# Patient Record
Sex: MALE | Race: WHITE | Employment: OTHER | URBAN - METROPOLITAN AREA
[De-identification: names, ages, dates, MRNs, and addresses within clinical notes are randomized per-mention and may not be internally consistent; named-entity substitution may affect disease eponyms.]

---

## 2017-10-27 PROBLEM — N28.89 KIDNEY MASS: Status: ACTIVE | Noted: 2017-01-01

## 2018-01-01 ENCOUNTER — HOSPITAL ENCOUNTER (OUTPATIENT)
Age: 83
Discharge: HOME | End: 2018-07-05
Payer: COMMERCIAL

## 2018-01-01 ENCOUNTER — HOSPITAL ENCOUNTER (OUTPATIENT)
Age: 83
Discharge: HOME | End: 2018-06-28
Payer: MEDICARE

## 2018-01-01 ENCOUNTER — ANESTHESIA (OUTPATIENT)
Dept: GENERAL RADIOLOGY | Age: 83
DRG: 444 | End: 2018-01-01
Payer: MEDICARE

## 2018-01-01 ENCOUNTER — ANESTHESIA EVENT (OUTPATIENT)
Dept: INTERVENTIONAL RADIOLOGY/VASCULAR | Age: 83
End: 2018-01-01

## 2018-01-01 ENCOUNTER — APPOINTMENT (OUTPATIENT)
Dept: GENERAL RADIOLOGY | Age: 83
DRG: 444 | End: 2018-01-01
Payer: MEDICARE

## 2018-01-01 ENCOUNTER — TELEPHONE (OUTPATIENT)
Dept: PRIMARY CARE CLINIC | Age: 83
End: 2018-01-01

## 2018-01-01 ENCOUNTER — HOSPITAL ENCOUNTER (EMERGENCY)
Age: 83
Discharge: HOME | End: 2018-06-20
Attending: EMERGENCY MEDICINE
Payer: MEDICARE

## 2018-01-01 ENCOUNTER — HOSPITAL ENCOUNTER (OUTPATIENT)
Age: 83
Discharge: HOME | End: 2018-07-03
Payer: MEDICARE

## 2018-01-01 ENCOUNTER — APPOINTMENT (OUTPATIENT)
Dept: MRI IMAGING | Age: 83
DRG: 444 | End: 2018-01-01
Payer: MEDICARE

## 2018-01-01 ENCOUNTER — HOSPITAL ENCOUNTER (INPATIENT)
Age: 83
LOS: 9 days | Discharge: HOSPICE | DRG: 444 | End: 2018-07-05
Attending: EMERGENCY MEDICINE | Admitting: INTERNAL MEDICINE
Payer: MEDICARE

## 2018-01-01 ENCOUNTER — APPOINTMENT (OUTPATIENT)
Dept: ULTRASOUND IMAGING | Age: 83
DRG: 444 | End: 2018-01-01
Payer: MEDICARE

## 2018-01-01 ENCOUNTER — HOSPITAL ENCOUNTER (OUTPATIENT)
Age: 83
Discharge: HOME | End: 2018-04-05
Payer: MEDICARE

## 2018-01-01 ENCOUNTER — ANESTHESIA (OUTPATIENT)
Dept: ENDOSCOPY | Age: 83
DRG: 444 | End: 2018-01-01
Payer: MEDICARE

## 2018-01-01 ENCOUNTER — APPOINTMENT (OUTPATIENT)
Dept: CT IMAGING | Age: 83
DRG: 444 | End: 2018-01-01
Payer: MEDICARE

## 2018-01-01 ENCOUNTER — ANESTHESIA (OUTPATIENT)
Dept: INTERVENTIONAL RADIOLOGY/VASCULAR | Age: 83
End: 2018-01-01

## 2018-01-01 ENCOUNTER — APPOINTMENT (OUTPATIENT)
Dept: INTERVENTIONAL RADIOLOGY/VASCULAR | Age: 83
End: 2018-01-01
Payer: MEDICARE

## 2018-01-01 ENCOUNTER — ANESTHESIA EVENT (OUTPATIENT)
Dept: GENERAL RADIOLOGY | Age: 83
DRG: 444 | End: 2018-01-01
Payer: MEDICARE

## 2018-01-01 ENCOUNTER — ANESTHESIA EVENT (OUTPATIENT)
Dept: ENDOSCOPY | Age: 83
DRG: 444 | End: 2018-01-01
Payer: MEDICARE

## 2018-01-01 VITALS
HEIGHT: 72 IN | OXYGEN SATURATION: 98 % | TEMPERATURE: 97.3 F | WEIGHT: 273 LBS | HEART RATE: 75 BPM | RESPIRATION RATE: 22 BRPM | DIASTOLIC BLOOD PRESSURE: 54 MMHG | SYSTOLIC BLOOD PRESSURE: 80 MMHG | BODY MASS INDEX: 36.98 KG/M2

## 2018-01-01 VITALS
TEMPERATURE: 98.5 F | SYSTOLIC BLOOD PRESSURE: 149 MMHG | RESPIRATION RATE: 16 BRPM | OXYGEN SATURATION: 95 % | DIASTOLIC BLOOD PRESSURE: 76 MMHG | HEART RATE: 76 BPM

## 2018-01-01 VITALS
SYSTOLIC BLOOD PRESSURE: 89 MMHG | DIASTOLIC BLOOD PRESSURE: 52 MMHG | HEART RATE: 64 BPM | RESPIRATION RATE: 18 BRPM | OXYGEN SATURATION: 98 %

## 2018-01-01 VITALS — DIASTOLIC BLOOD PRESSURE: 49 MMHG | OXYGEN SATURATION: 86 % | TEMPERATURE: 96.8 F | SYSTOLIC BLOOD PRESSURE: 69 MMHG

## 2018-01-01 VITALS — SYSTOLIC BLOOD PRESSURE: 89 MMHG | DIASTOLIC BLOOD PRESSURE: 51 MMHG | OXYGEN SATURATION: 95 %

## 2018-01-01 DIAGNOSIS — K83.1 OBSTRUCTIVE JAUNDICE DUE TO MALIGNANT NEOPLASM (HCC): ICD-10-CM

## 2018-01-01 DIAGNOSIS — R31.0 GROSS HEMATURIA: Primary | ICD-10-CM

## 2018-01-01 DIAGNOSIS — I10 ESSENTIAL HYPERTENSION: ICD-10-CM

## 2018-01-01 DIAGNOSIS — N40.1 BENIGN NON-NODULAR PROSTATIC HYPERPLASIA WITH LOWER URINARY TRACT SYMPTOMS: ICD-10-CM

## 2018-01-01 DIAGNOSIS — E78.00 PURE HYPERCHOLESTEROLEMIA: ICD-10-CM

## 2018-01-01 DIAGNOSIS — M1A.49X0 OTHER SECONDARY CHRONIC GOUT OF MULTIPLE SITES WITHOUT TOPHUS: ICD-10-CM

## 2018-01-01 DIAGNOSIS — R16.0 LIVER MASS: Primary | ICD-10-CM

## 2018-01-01 DIAGNOSIS — C80.1 OBSTRUCTIVE JAUNDICE DUE TO MALIGNANT NEOPLASM (HCC): ICD-10-CM

## 2018-01-01 DIAGNOSIS — N17.9 ACUTE RENAL FAILURE, UNSPECIFIED ACUTE RENAL FAILURE TYPE (HCC): Primary | ICD-10-CM

## 2018-01-01 LAB
ADDENDUM ELECTROPHORESIS URINE RANDOM: NORMAL
AFP-TUMOR MARKER: 1 NG/ML (ref 0–9)
ALBUMIN SERPL-MCNC: 1.9 G/DL (ref 3.5–4.7)
ALBUMIN SERPL-MCNC: 2 G/DL (ref 3.5–5.2)
ALBUMIN SERPL-MCNC: 2 G/DL (ref 3.5–5.2)
ALBUMIN SERPL-MCNC: 2.1 G/DL (ref 3.5–5.2)
ALBUMIN SERPL-MCNC: 2.1 G/DL (ref 3.5–5.2)
ALBUMIN SERPL-MCNC: 2.2 G/DL (ref 3.5–5.2)
ALBUMIN SERPL-MCNC: 2.3 G/DL (ref 3.5–5.2)
ALBUMIN SERPL-MCNC: 2.4 G/DL (ref 3.5–5.2)
ALBUMIN SERPL-MCNC: 3.1 G/DL (ref 3.5–5.2)
ALBUMIN SERPL-MCNC: 3.8 G/DL (ref 3.5–5.2)
ALP BLD-CCNC: 151 U/L (ref 40–129)
ALP BLD-CCNC: 739 U/L (ref 40–129)
ALP BLD-CCNC: 767 U/L (ref 40–129)
ALP BLD-CCNC: 801 U/L (ref 40–129)
ALP BLD-CCNC: 820 U/L (ref 40–129)
ALP BLD-CCNC: 865 U/L (ref 40–129)
ALP BLD-CCNC: 866 U/L (ref 40–129)
ALP BLD-CCNC: 897 U/L (ref 40–129)
ALP BLD-CCNC: 901 U/L (ref 40–129)
ALPHA-1-GLOBULIN: 0.3 G/DL (ref 0.2–0.4)
ALPHA-2-GLOBULIN: 0.7 G/FL (ref 0.5–1)
ALT SERPL-CCNC: 12 U/L (ref 0–40)
ALT SERPL-CCNC: 56 U/L (ref 0–40)
ALT SERPL-CCNC: 61 U/L (ref 0–40)
ALT SERPL-CCNC: 66 U/L (ref 0–40)
ALT SERPL-CCNC: 78 U/L (ref 0–40)
ALT SERPL-CCNC: 86 U/L (ref 0–40)
ALT SERPL-CCNC: 86 U/L (ref 0–40)
ALT SERPL-CCNC: 95 U/L (ref 0–40)
ALT SERPL-CCNC: 97 U/L (ref 0–40)
AMORPHOUS: ABNORMAL
AMYLASE: 33 U/L (ref 20–100)
AMYLASE: 34 U/L (ref 20–100)
AMYLASE: 35 U/L (ref 20–100)
AMYLASE: 45 U/L (ref 20–100)
AMYLASE: 85 U/L (ref 20–100)
ANAEROBIC CULTURE: ABNORMAL
ANAEROBIC CULTURE: NORMAL
ANION GAP SERPL CALCULATED.3IONS-SCNC: 12 MMOL/L (ref 7–16)
ANION GAP SERPL CALCULATED.3IONS-SCNC: 13 MMOL/L (ref 7–16)
ANION GAP SERPL CALCULATED.3IONS-SCNC: 13 MMOL/L (ref 7–16)
ANION GAP SERPL CALCULATED.3IONS-SCNC: 14 MMOL/L (ref 7–16)
ANION GAP SERPL CALCULATED.3IONS-SCNC: 15 MMOL/L (ref 7–16)
ANION GAP SERPL CALCULATED.3IONS-SCNC: 16 MMOL/L (ref 7–16)
ANION GAP SERPL CALCULATED.3IONS-SCNC: 18 MMOL/L (ref 7–16)
ANION GAP SERPL CALCULATED.3IONS-SCNC: 18 MMOL/L (ref 7–16)
ANISOCYTOSIS: ABNORMAL
APPEARANCE FLUID: NORMAL
APTT: 25 SEC (ref 24.5–35.1)
APTT: 27.8 SEC (ref 24.5–35.1)
APTT: 28.3 SEC (ref 24.5–35.1)
AST SERPL-CCNC: 104 U/L (ref 0–39)
AST SERPL-CCNC: 106 U/L (ref 0–39)
AST SERPL-CCNC: 113 U/L (ref 0–39)
AST SERPL-CCNC: 163 U/L (ref 0–39)
AST SERPL-CCNC: 166 U/L (ref 0–39)
AST SERPL-CCNC: 199 U/L (ref 0–39)
AST SERPL-CCNC: 21 U/L (ref 0–39)
AST SERPL-CCNC: 214 U/L (ref 0–39)
AST SERPL-CCNC: 228 U/L (ref 0–39)
BACTERIA: ABNORMAL /HPF
BACTERIA: ABNORMAL /HPF
BASOPHILS ABSOLUTE: 0 E9/L (ref 0–0.2)
BASOPHILS ABSOLUTE: 0.03 E9/L (ref 0–0.2)
BASOPHILS ABSOLUTE: 0.04 E9/L (ref 0–0.2)
BASOPHILS ABSOLUTE: 0.04 E9/L (ref 0–0.2)
BASOPHILS ABSOLUTE: 0.05 E9/L (ref 0–0.2)
BASOPHILS ABSOLUTE: 0.05 E9/L (ref 0–0.2)
BASOPHILS ABSOLUTE: 0.06 E9/L (ref 0–0.2)
BASOPHILS ABSOLUTE: 0.06 E9/L (ref 0–0.2)
BASOPHILS ABSOLUTE: 0.09 E9/L (ref 0–0.2)
BASOPHILS ABSOLUTE: 0.1 E9/L (ref 0–0.2)
BASOPHILS RELATIVE PERCENT: 0 % (ref 0–2)
BASOPHILS RELATIVE PERCENT: 0.3 % (ref 0–2)
BASOPHILS RELATIVE PERCENT: 0.4 % (ref 0–2)
BASOPHILS RELATIVE PERCENT: 0.5 % (ref 0–2)
BASOPHILS RELATIVE PERCENT: 0.7 % (ref 0–2)
BASOPHILS RELATIVE PERCENT: 1 % (ref 0–2)
BASOPHILS RELATIVE PERCENT: 1.3 % (ref 0–2)
BETA GLOBULIN: 0.9 G/DL (ref 0.8–1.3)
BILIRUB SERPL-MCNC: 0.4 MG/DL (ref 0–1.2)
BILIRUB SERPL-MCNC: 1.7 MG/DL (ref 0–1.2)
BILIRUB SERPL-MCNC: 2.5 MG/DL (ref 0–1.2)
BILIRUB SERPL-MCNC: 3.4 MG/DL (ref 0–1.2)
BILIRUB SERPL-MCNC: 3.4 MG/DL (ref 0–1.2)
BILIRUB SERPL-MCNC: 3.7 MG/DL (ref 0–1.2)
BILIRUB SERPL-MCNC: 4.7 MG/DL (ref 0–1.2)
BILIRUB SERPL-MCNC: 5 MG/DL (ref 0–1.2)
BILIRUB SERPL-MCNC: 5.4 MG/DL (ref 0–1.2)
BILIRUBIN DIRECT: 1.9 MG/DL (ref 0–0.3)
BILIRUBIN DIRECT: 2.8 MG/DL (ref 0–0.3)
BILIRUBIN DIRECT: 3.3 MG/DL (ref 0–0.3)
BILIRUBIN DIRECT: 3.5 MG/DL (ref 0–0.3)
BILIRUBIN DIRECT: 4.3 MG/DL (ref 0–0.3)
BILIRUBIN DIRECT: 4.6 MG/DL (ref 0–0.3)
BILIRUBIN DIRECT: 4.9 MG/DL (ref 0–0.3)
BILIRUBIN URINE: ABNORMAL
BILIRUBIN URINE: NEGATIVE
BILIRUBIN, INDIRECT: 0.1 MG/DL (ref 0–1)
BILIRUBIN, INDIRECT: 0.2 MG/DL (ref 0–1)
BILIRUBIN, INDIRECT: 0.4 MG/DL (ref 0–1)
BILIRUBIN, INDIRECT: 0.4 MG/DL (ref 0–1)
BILIRUBIN, INDIRECT: 0.5 MG/DL (ref 0–1)
BILIRUBIN, INDIRECT: 0.6 MG/DL (ref 0–1)
BILIRUBIN, INDIRECT: 0.6 MG/DL (ref 0–1)
BLOOD CULTURE, ROUTINE: ABNORMAL
BLOOD CULTURE, ROUTINE: ABNORMAL
BLOOD, URINE: ABNORMAL
BLOOD, URINE: NEGATIVE
BODY FLUID CULTURE, STERILE: NORMAL
BUN BLDV-MCNC: 39 MG/DL (ref 8–23)
BUN BLDV-MCNC: 54 MG/DL (ref 8–23)
BUN BLDV-MCNC: 64 MG/DL (ref 8–23)
BUN BLDV-MCNC: 65 MG/DL (ref 8–23)
BUN BLDV-MCNC: 67 MG/DL (ref 8–23)
BUN BLDV-MCNC: 72 MG/DL (ref 8–23)
BUN BLDV-MCNC: 76 MG/DL (ref 8–23)
BUN BLDV-MCNC: 78 MG/DL (ref 8–23)
BUN BLDV-MCNC: 85 MG/DL (ref 8–23)
BUN BLDV-MCNC: 85 MG/DL (ref 8–23)
BUN BLDV-MCNC: 87 MG/DL (ref 8–23)
BURR CELLS: ABNORMAL
C-REACTIVE PROTEIN: 4.1 MG/DL (ref 0–0.4)
CA 19-9: 1373 U/ML (ref 0–37)
CALCIUM IONIZED: 1.04 MMOL/L (ref 1.15–1.33)
CALCIUM IONIZED: 1.11 MMOL/L (ref 1.15–1.33)
CALCIUM IONIZED: 1.14 MMOL/L (ref 1.15–1.33)
CALCIUM SERPL-MCNC: 6.5 MG/DL (ref 8.6–10.2)
CALCIUM SERPL-MCNC: 6.8 MG/DL (ref 8.6–10.2)
CALCIUM SERPL-MCNC: 7.1 MG/DL (ref 8.6–10.2)
CALCIUM SERPL-MCNC: 7.3 MG/DL (ref 8.6–10.2)
CALCIUM SERPL-MCNC: 7.4 MG/DL (ref 8.6–10.2)
CALCIUM SERPL-MCNC: 7.4 MG/DL (ref 8.6–10.2)
CALCIUM SERPL-MCNC: 7.5 MG/DL (ref 8.6–10.2)
CALCIUM SERPL-MCNC: 8.3 MG/DL (ref 8.6–10.2)
CALCIUM SERPL-MCNC: 8.6 MG/DL (ref 8.6–10.2)
CASTS 2: ABNORMAL /LPF
CASTS UA: ABNORMAL /LPF
CASTS: ABNORMAL /LPF
CASTS: ABNORMAL /LPF
CEA: 59.8 NG/ML (ref 0–5.2)
CELL COUNT FLUID TYPE: NORMAL
CHLORIDE BLD-SCNC: 101 MMOL/L (ref 98–107)
CHLORIDE BLD-SCNC: 102 MMOL/L (ref 98–107)
CHLORIDE BLD-SCNC: 102 MMOL/L (ref 98–107)
CHLORIDE BLD-SCNC: 103 MMOL/L (ref 98–107)
CHLORIDE BLD-SCNC: 105 MMOL/L (ref 98–107)
CHLORIDE BLD-SCNC: 106 MMOL/L (ref 98–107)
CHLORIDE BLD-SCNC: 109 MMOL/L (ref 98–107)
CHLORIDE BLD-SCNC: 110 MMOL/L (ref 98–107)
CHLORIDE BLD-SCNC: 111 MMOL/L (ref 98–107)
CHLORIDE URINE RANDOM: 29 MMOL/L
CK MB: 2.2 NG/ML (ref 0–7.7)
CLARITY: ABNORMAL
CLARITY: CLEAR
CO2: 14 MMOL/L (ref 22–29)
CO2: 15 MMOL/L (ref 22–29)
CO2: 16 MMOL/L (ref 22–29)
CO2: 16 MMOL/L (ref 22–29)
CO2: 17 MMOL/L (ref 22–29)
CO2: 17 MMOL/L (ref 22–29)
CO2: 18 MMOL/L (ref 22–29)
CO2: 18 MMOL/L (ref 22–29)
CO2: 20 MMOL/L (ref 22–29)
CO2: 24 MMOL/L (ref 22–29)
CO2: 25 MMOL/L (ref 22–29)
COLOR FLUID: YELLOW
COLOR: ABNORMAL
COLOR: YELLOW
CORTISOL TOTAL: 14.83 MCG/DL (ref 2.68–18.4)
CORTISOL TOTAL: 17.37 MCG/DL (ref 2.68–18.4)
CREAT SERPL-MCNC: 2.1 MG/DL (ref 0.7–1.2)
CREAT SERPL-MCNC: 2.4 MG/DL (ref 0.7–1.2)
CREAT SERPL-MCNC: 3.1 MG/DL (ref 0.7–1.2)
CREAT SERPL-MCNC: 3.2 MG/DL (ref 0.7–1.2)
CREAT SERPL-MCNC: 3.3 MG/DL (ref 0.7–1.2)
CREAT SERPL-MCNC: 3.6 MG/DL (ref 0.7–1.2)
CREAT SERPL-MCNC: 3.7 MG/DL (ref 0.7–1.2)
CREAT SERPL-MCNC: 3.9 MG/DL (ref 0.7–1.2)
CREAT SERPL-MCNC: 4.2 MG/DL (ref 0.7–1.2)
CREAT SERPL-MCNC: 4.4 MG/DL (ref 0.7–1.2)
CREAT SERPL-MCNC: 4.7 MG/DL (ref 0.7–1.2)
CRYSTALS, UA: ABNORMAL
CYCLOSPORINE A: 188.6 NG/ML
CYCLOSPORINE A: 63.8 NG/ML
EKG ATRIAL RATE: 72 BPM
EKG Q-T INTERVAL: 428 MS
EKG QRS DURATION: 64 MS
EKG QTC CALCULATION (BAZETT): 468 MS
EKG R AXIS: 20 DEGREES
EKG T AXIS: 6 DEGREES
EKG VENTRICULAR RATE: 72 BPM
ELECTROPHORESIS: ABNORMAL
EOSINOPHILS ABSOLUTE: 0.04 E9/L (ref 0.05–0.5)
EOSINOPHILS ABSOLUTE: 0.06 E9/L (ref 0.05–0.5)
EOSINOPHILS ABSOLUTE: 0.08 E9/L (ref 0.05–0.5)
EOSINOPHILS ABSOLUTE: 0.1 E9/L (ref 0.05–0.5)
EOSINOPHILS ABSOLUTE: 0.12 E9/L (ref 0.05–0.5)
EOSINOPHILS ABSOLUTE: 0.18 E9/L (ref 0.05–0.5)
EOSINOPHILS ABSOLUTE: 0.2 E9/L (ref 0.05–0.5)
EOSINOPHILS ABSOLUTE: 0.23 E9/L (ref 0.05–0.5)
EOSINOPHILS ABSOLUTE: 0.27 E9/L (ref 0.05–0.5)
EOSINOPHILS ABSOLUTE: 0.29 E9/L (ref 0.05–0.5)
EOSINOPHILS RELATIVE PERCENT: 0.5 % (ref 0–6)
EOSINOPHILS RELATIVE PERCENT: 0.6 % (ref 0–6)
EOSINOPHILS RELATIVE PERCENT: 0.8 % (ref 0–6)
EOSINOPHILS RELATIVE PERCENT: 0.9 % (ref 0–6)
EOSINOPHILS RELATIVE PERCENT: 1 % (ref 0–6)
EOSINOPHILS RELATIVE PERCENT: 1.7 % (ref 0–6)
EOSINOPHILS RELATIVE PERCENT: 1.8 % (ref 0–6)
EOSINOPHILS RELATIVE PERCENT: 1.9 % (ref 0–6)
EOSINOPHILS RELATIVE PERCENT: 2.2 % (ref 0–6)
EOSINOPHILS RELATIVE PERCENT: 2.4 % (ref 0–6)
EPITHELIAL CELLS, UA: ABNORMAL /HPF
FERRITIN: 426 NG/ML
FOLATE: 4.9 NG/ML (ref 4.8–24.2)
GAMMA GLOBULIN: 0.6 G/DL (ref 0.7–1.6)
GFR AFRICAN AMERICAN: 14
GFR AFRICAN AMERICAN: 16
GFR AFRICAN AMERICAN: 17
GFR AFRICAN AMERICAN: 18
GFR AFRICAN AMERICAN: 19
GFR AFRICAN AMERICAN: 20
GFR AFRICAN AMERICAN: 22
GFR AFRICAN AMERICAN: 23
GFR AFRICAN AMERICAN: 23
GFR AFRICAN AMERICAN: 31
GFR AFRICAN AMERICAN: 37
GFR NON-AFRICAN AMERICAN: 12 ML/MIN/1.73
GFR NON-AFRICAN AMERICAN: 13 ML/MIN/1.73
GFR NON-AFRICAN AMERICAN: 14 ML/MIN/1.73
GFR NON-AFRICAN AMERICAN: 15 ML/MIN/1.73
GFR NON-AFRICAN AMERICAN: 16 ML/MIN/1.73
GFR NON-AFRICAN AMERICAN: 16 ML/MIN/1.73
GFR NON-AFRICAN AMERICAN: 18 ML/MIN/1.73
GFR NON-AFRICAN AMERICAN: 19 ML/MIN/1.73
GFR NON-AFRICAN AMERICAN: 19 ML/MIN/1.73
GFR NON-AFRICAN AMERICAN: 26 ML/MIN/1.73
GFR NON-AFRICAN AMERICAN: 30 ML/MIN/1.73
GLUCOSE BLD-MCNC: 107 MG/DL (ref 74–109)
GLUCOSE BLD-MCNC: 110 MG/DL (ref 74–109)
GLUCOSE BLD-MCNC: 110 MG/DL (ref 74–109)
GLUCOSE BLD-MCNC: 112 MG/DL (ref 74–109)
GLUCOSE BLD-MCNC: 122 MG/DL (ref 74–109)
GLUCOSE BLD-MCNC: 123 MG/DL (ref 74–109)
GLUCOSE BLD-MCNC: 123 MG/DL (ref 74–109)
GLUCOSE BLD-MCNC: 128 MG/DL (ref 74–109)
GLUCOSE BLD-MCNC: 128 MG/DL (ref 74–109)
GLUCOSE BLD-MCNC: 131 MG/DL (ref 74–109)
GLUCOSE BLD-MCNC: 149 MG/DL (ref 74–109)
GLUCOSE URINE: NEGATIVE MG/DL
GLUCOSE URINE: NEGATIVE MG/DL
GRAM STAIN ORDERABLE: NORMAL
GRAM STAIN RESULT: NORMAL
HCT VFR BLD CALC: 31.6 % (ref 37–54)
HCT VFR BLD CALC: 32.4 % (ref 37–54)
HCT VFR BLD CALC: 32.7 % (ref 37–54)
HCT VFR BLD CALC: 32.9 % (ref 37–54)
HCT VFR BLD CALC: 33.5 % (ref 37–54)
HCT VFR BLD CALC: 33.7 % (ref 37–54)
HCT VFR BLD CALC: 33.7 % (ref 37–54)
HCT VFR BLD CALC: 34.9 % (ref 37–54)
HCT VFR BLD CALC: 36 % (ref 37–54)
HCT VFR BLD CALC: 37.2 % (ref 37–54)
HCT VFR BLD CALC: 39.3 % (ref 37–54)
HEMOGLOBIN: 10.2 G/DL (ref 12.5–16.5)
HEMOGLOBIN: 10.3 G/DL (ref 12.5–16.5)
HEMOGLOBIN: 10.3 G/DL (ref 12.5–16.5)
HEMOGLOBIN: 10.4 G/DL (ref 12.5–16.5)
HEMOGLOBIN: 10.5 G/DL (ref 12.5–16.5)
HEMOGLOBIN: 10.7 G/DL (ref 12.5–16.5)
HEMOGLOBIN: 10.7 G/DL (ref 12.5–16.5)
HEMOGLOBIN: 11.3 G/DL (ref 12.5–16.5)
HEMOGLOBIN: 11.8 G/DL (ref 12.5–16.5)
HEMOGLOBIN: 9.6 G/DL (ref 12.5–16.5)
HEMOGLOBIN: 9.9 G/DL (ref 12.5–16.5)
IMMATURE GRANULOCYTES #: 0.04 E9/L
IMMATURE GRANULOCYTES #: 0.09 E9/L
IMMATURE GRANULOCYTES #: 0.1 E9/L
IMMATURE GRANULOCYTES #: 0.12 E9/L
IMMATURE GRANULOCYTES #: 0.18 E9/L
IMMATURE GRANULOCYTES #: 0.18 E9/L
IMMATURE GRANULOCYTES #: 0.3 E9/L
IMMATURE GRANULOCYTES #: 0.38 E9/L
IMMATURE GRANULOCYTES #: 0.42 E9/L
IMMATURE GRANULOCYTES %: 0.5 % (ref 0–5)
IMMATURE GRANULOCYTES %: 1 % (ref 0–5)
IMMATURE GRANULOCYTES %: 1.2 % (ref 0–5)
IMMATURE GRANULOCYTES %: 1.4 % (ref 0–5)
IMMATURE GRANULOCYTES %: 1.4 % (ref 0–5)
IMMATURE GRANULOCYTES %: 1.9 % (ref 0–5)
IMMATURE GRANULOCYTES %: 2.4 % (ref 0–5)
IMMATURE GRANULOCYTES %: 2.7 % (ref 0–5)
IMMATURE GRANULOCYTES %: 3.7 % (ref 0–5)
IMMUNOFIXATION URINE: NORMAL
INR BLD: 1.1
INR BLD: 1.2
INR BLD: 1.2
IRON SATURATION: 27 % (ref 20–55)
IRON: 44 MCG/DL (ref 59–158)
KETONES, URINE: ABNORMAL MG/DL
KETONES, URINE: NEGATIVE MG/DL
LACTIC ACID: 1 MMOL/L (ref 0.5–2.2)
LACTIC ACID: 2 MMOL/L (ref 0.5–2.2)
LEUKOCYTE ESTERASE, URINE: NEGATIVE
LEUKOCYTE ESTERASE, URINE: NEGATIVE
LIPASE: 230 U/L (ref 13–60)
LIPASE: 54 U/L (ref 13–60)
LIPASE: 59 U/L (ref 13–60)
LIPASE: 63 U/L (ref 13–60)
LIPASE: 66 U/L (ref 13–60)
LIPASE: 91 U/L (ref 13–60)
LV EF: 60 %
LVEF MODALITY: NORMAL
LYMPHOCYTES ABSOLUTE: 0.72 E9/L (ref 1.5–4)
LYMPHOCYTES ABSOLUTE: 0.74 E9/L (ref 1.5–4)
LYMPHOCYTES ABSOLUTE: 0.76 E9/L (ref 1.5–4)
LYMPHOCYTES ABSOLUTE: 0.8 E9/L (ref 1.5–4)
LYMPHOCYTES ABSOLUTE: 0.81 E9/L (ref 1.5–4)
LYMPHOCYTES ABSOLUTE: 0.83 E9/L (ref 1.5–4)
LYMPHOCYTES ABSOLUTE: 0.93 E9/L (ref 1.5–4)
LYMPHOCYTES ABSOLUTE: 0.94 E9/L (ref 1.5–4)
LYMPHOCYTES ABSOLUTE: 0.99 E9/L (ref 1.5–4)
LYMPHOCYTES ABSOLUTE: 1.4 E9/L (ref 1.5–4)
LYMPHOCYTES RELATIVE PERCENT: 10.6 % (ref 20–42)
LYMPHOCYTES RELATIVE PERCENT: 11.4 % (ref 20–42)
LYMPHOCYTES RELATIVE PERCENT: 18.7 % (ref 20–42)
LYMPHOCYTES RELATIVE PERCENT: 5.2 % (ref 20–42)
LYMPHOCYTES RELATIVE PERCENT: 5.8 % (ref 20–42)
LYMPHOCYTES RELATIVE PERCENT: 6.1 % (ref 20–42)
LYMPHOCYTES RELATIVE PERCENT: 7.3 % (ref 20–42)
LYMPHOCYTES RELATIVE PERCENT: 7.4 % (ref 20–42)
LYMPHOCYTES RELATIVE PERCENT: 7.5 % (ref 20–42)
LYMPHOCYTES RELATIVE PERCENT: 9 % (ref 20–42)
MAGNESIUM: 1.9 MG/DL (ref 1.6–2.6)
MAGNESIUM: 1.9 MG/DL (ref 1.6–2.6)
MAGNESIUM: 2.1 MG/DL (ref 1.6–2.6)
MCH RBC QN AUTO: 26.5 PG (ref 26–35)
MCH RBC QN AUTO: 26.6 PG (ref 26–35)
MCH RBC QN AUTO: 27 PG (ref 26–35)
MCH RBC QN AUTO: 27.1 PG (ref 26–35)
MCH RBC QN AUTO: 27.4 PG (ref 26–35)
MCH RBC QN AUTO: 27.5 PG (ref 26–35)
MCH RBC QN AUTO: 27.5 PG (ref 26–35)
MCH RBC QN AUTO: 27.6 PG (ref 26–35)
MCHC RBC AUTO-ENTMCNC: 29.7 % (ref 32–34.5)
MCHC RBC AUTO-ENTMCNC: 30 % (ref 32–34.5)
MCHC RBC AUTO-ENTMCNC: 30.3 % (ref 32–34.5)
MCHC RBC AUTO-ENTMCNC: 30.4 % (ref 32–34.5)
MCHC RBC AUTO-ENTMCNC: 30.4 % (ref 32–34.5)
MCHC RBC AUTO-ENTMCNC: 30.6 % (ref 32–34.5)
MCHC RBC AUTO-ENTMCNC: 30.7 % (ref 32–34.5)
MCHC RBC AUTO-ENTMCNC: 30.9 % (ref 32–34.5)
MCHC RBC AUTO-ENTMCNC: 31.3 % (ref 32–34.5)
MCHC RBC AUTO-ENTMCNC: 31.3 % (ref 32–34.5)
MCHC RBC AUTO-ENTMCNC: 31.5 % (ref 32–34.5)
MCV RBC AUTO: 85.7 FL (ref 80–99.9)
MCV RBC AUTO: 87.4 FL (ref 80–99.9)
MCV RBC AUTO: 88 FL (ref 80–99.9)
MCV RBC AUTO: 88.1 FL (ref 80–99.9)
MCV RBC AUTO: 88.2 FL (ref 80–99.9)
MCV RBC AUTO: 88.2 FL (ref 80–99.9)
MCV RBC AUTO: 89 FL (ref 80–99.9)
MCV RBC AUTO: 89.2 FL (ref 80–99.9)
MCV RBC AUTO: 89.3 FL (ref 80–99.9)
MCV RBC AUTO: 90.1 FL (ref 80–99.9)
MCV RBC AUTO: 90.1 FL (ref 80–99.9)
METAMYELOCYTES RELATIVE PERCENT: 0.9 % (ref 0–1)
MONOCYTE, FLUID: 42 %
MONOCYTES ABSOLUTE: 0.44 E9/L (ref 0.1–0.95)
MONOCYTES ABSOLUTE: 0.63 E9/L (ref 0.1–0.95)
MONOCYTES ABSOLUTE: 0.68 E9/L (ref 0.1–0.95)
MONOCYTES ABSOLUTE: 0.81 E9/L (ref 0.1–0.95)
MONOCYTES ABSOLUTE: 0.85 E9/L (ref 0.1–0.95)
MONOCYTES ABSOLUTE: 0.88 E9/L (ref 0.1–0.95)
MONOCYTES ABSOLUTE: 0.93 E9/L (ref 0.1–0.95)
MONOCYTES ABSOLUTE: 1.01 E9/L (ref 0.1–0.95)
MONOCYTES ABSOLUTE: 1.2 E9/L (ref 0.1–0.95)
MONOCYTES ABSOLUTE: 1.26 E9/L (ref 0.1–0.95)
MONOCYTES RELATIVE PERCENT: 10.1 % (ref 2–12)
MONOCYTES RELATIVE PERCENT: 2.6 % (ref 2–12)
MONOCYTES RELATIVE PERCENT: 6.7 % (ref 2–12)
MONOCYTES RELATIVE PERCENT: 8.3 % (ref 2–12)
MONOCYTES RELATIVE PERCENT: 8.4 % (ref 2–12)
MONOCYTES RELATIVE PERCENT: 8.7 % (ref 2–12)
MONOCYTES RELATIVE PERCENT: 8.8 % (ref 2–12)
MONOCYTES RELATIVE PERCENT: 9.1 % (ref 2–12)
MONOCYTES RELATIVE PERCENT: 9.5 % (ref 2–12)
MONOCYTES RELATIVE PERCENT: 9.6 % (ref 2–12)
MUCUS: PRESENT
NEUTROPHIL, FLUID: 58 %
NEUTROPHILS ABSOLUTE: 10.91 E9/L (ref 1.8–7.3)
NEUTROPHILS ABSOLUTE: 11.15 E9/L (ref 1.8–7.3)
NEUTROPHILS ABSOLUTE: 13.47 E9/L (ref 1.8–7.3)
NEUTROPHILS ABSOLUTE: 5.12 E9/L (ref 1.8–7.3)
NEUTROPHILS ABSOLUTE: 6.34 E9/L (ref 1.8–7.3)
NEUTROPHILS ABSOLUTE: 6.59 E9/L (ref 1.8–7.3)
NEUTROPHILS ABSOLUTE: 7.31 E9/L (ref 1.8–7.3)
NEUTROPHILS ABSOLUTE: 7.72 E9/L (ref 1.8–7.3)
NEUTROPHILS ABSOLUTE: 8.92 E9/L (ref 1.8–7.3)
NEUTROPHILS ABSOLUTE: 9.88 E9/L (ref 1.8–7.3)
NEUTROPHILS RELATIVE PERCENT: 68.7 % (ref 43–80)
NEUTROPHILS RELATIVE PERCENT: 77.9 % (ref 43–80)
NEUTROPHILS RELATIVE PERCENT: 78.1 % (ref 43–80)
NEUTROPHILS RELATIVE PERCENT: 78.1 % (ref 43–80)
NEUTROPHILS RELATIVE PERCENT: 78.2 % (ref 43–80)
NEUTROPHILS RELATIVE PERCENT: 78.4 % (ref 43–80)
NEUTROPHILS RELATIVE PERCENT: 79.7 % (ref 43–80)
NEUTROPHILS RELATIVE PERCENT: 80.2 % (ref 43–80)
NEUTROPHILS RELATIVE PERCENT: 83.1 % (ref 43–80)
NEUTROPHILS RELATIVE PERCENT: 90.5 % (ref 43–80)
NITRITE, URINE: NEGATIVE
NITRITE, URINE: NEGATIVE
NUCLEATED CELLS FLUID: 233 /UL
NUCLEATED RED BLOOD CELLS: 0 /100 WBC
ORGANISM: ABNORMAL
PARATHYROID HORMONE INTACT: 345 PG/ML (ref 15–65)
PDW BLD-RTO: 15.1 FL (ref 11.5–15)
PDW BLD-RTO: 15.9 FL (ref 11.5–15)
PDW BLD-RTO: 17 FL (ref 11.5–15)
PDW BLD-RTO: 17.2 FL (ref 11.5–15)
PDW BLD-RTO: 17.3 FL (ref 11.5–15)
PDW BLD-RTO: 18.1 FL (ref 11.5–15)
PDW BLD-RTO: 18.6 FL (ref 11.5–15)
PDW BLD-RTO: 18.8 FL (ref 11.5–15)
PDW BLD-RTO: 19.4 FL (ref 11.5–15)
PDW BLD-RTO: 19.5 FL (ref 11.5–15)
PDW BLD-RTO: 20.2 FL (ref 11.5–15)
PH UA: 5 (ref 5–9)
PH UA: 5.5 (ref 5–9)
PHOSPHORUS: 4.3 MG/DL (ref 2.5–4.5)
PHOSPHORUS: 4.4 MG/DL (ref 2.5–4.5)
PLATELET # BLD: 245 E9/L (ref 130–450)
PLATELET # BLD: 250 E9/L (ref 130–450)
PLATELET # BLD: 255 E9/L (ref 130–450)
PLATELET # BLD: 256 E9/L (ref 130–450)
PLATELET # BLD: 258 E9/L (ref 130–450)
PLATELET # BLD: 270 E9/L (ref 130–450)
PLATELET # BLD: 273 E9/L (ref 130–450)
PLATELET # BLD: 284 E9/L (ref 130–450)
PLATELET # BLD: 287 E9/L (ref 130–450)
PLATELET # BLD: 290 E9/L (ref 130–450)
PLATELET # BLD: 291 E9/L (ref 130–450)
PMV BLD AUTO: 10 FL (ref 7–12)
PMV BLD AUTO: 10.1 FL (ref 7–12)
PMV BLD AUTO: 10.2 FL (ref 7–12)
PMV BLD AUTO: 10.3 FL (ref 7–12)
PMV BLD AUTO: 10.3 FL (ref 7–12)
PMV BLD AUTO: 9.6 FL (ref 7–12)
PMV BLD AUTO: 9.8 FL (ref 7–12)
POIKILOCYTES: ABNORMAL
POLYCHROMASIA: ABNORMAL
POTASSIUM REFLEX MAGNESIUM: 4.4 MMOL/L (ref 3.5–5)
POTASSIUM SERPL-SCNC: 4.3 MMOL/L (ref 3.5–5)
POTASSIUM SERPL-SCNC: 4.3 MMOL/L (ref 3.5–5)
POTASSIUM SERPL-SCNC: 4.4 MMOL/L (ref 3.5–5)
POTASSIUM SERPL-SCNC: 4.4 MMOL/L (ref 3.5–5)
POTASSIUM SERPL-SCNC: 4.7 MMOL/L (ref 3.5–5)
POTASSIUM SERPL-SCNC: 4.8 MMOL/L (ref 3.5–5)
POTASSIUM SERPL-SCNC: 4.9 MMOL/L (ref 3.5–5)
POTASSIUM SERPL-SCNC: 5 MMOL/L (ref 3.5–5)
POTASSIUM SERPL-SCNC: 5 MMOL/L (ref 3.5–5)
POTASSIUM SERPL-SCNC: 5.4 MMOL/L (ref 3.5–5)
POTASSIUM, UR: 19.7 MMOL/L
PRO-BNP: 5150 PG/ML (ref 0–450)
PROTEIN UA: 30 MG/DL
PROTEIN UA: NORMAL MG/DL
PROTHROMBIN TIME: 12.9 SEC (ref 9.3–12.4)
PROTHROMBIN TIME: 13.4 SEC (ref 9.3–12.4)
PROTHROMBIN TIME: 13.9 SEC (ref 9.3–12.4)
RBC # BLD: 3.55 E12/L (ref 3.8–5.8)
RBC # BLD: 3.74 E12/L (ref 3.8–5.8)
RBC # BLD: 3.74 E12/L (ref 3.8–5.8)
RBC # BLD: 3.78 E12/L (ref 3.8–5.8)
RBC # BLD: 3.78 E12/L (ref 3.8–5.8)
RBC # BLD: 3.8 E12/L (ref 3.8–5.8)
RBC # BLD: 3.82 E12/L (ref 3.8–5.8)
RBC # BLD: 3.96 E12/L (ref 3.8–5.8)
RBC # BLD: 4.03 E12/L (ref 3.8–5.8)
RBC # BLD: 4.13 E12/L (ref 3.8–5.8)
RBC # BLD: 4.36 E12/L (ref 3.8–5.8)
RBC FLUID: <2000 /UL
RBC UA: ABNORMAL /HPF (ref 0–2)
RBC UA: ABNORMAL /HPF (ref 0–2)
SEDIMENTATION RATE, ERYTHROCYTE: 17 MM/HR (ref 0–15)
SODIUM BLD-SCNC: 135 MMOL/L (ref 132–146)
SODIUM BLD-SCNC: 136 MMOL/L (ref 132–146)
SODIUM BLD-SCNC: 136 MMOL/L (ref 132–146)
SODIUM BLD-SCNC: 137 MMOL/L (ref 132–146)
SODIUM BLD-SCNC: 138 MMOL/L (ref 132–146)
SODIUM BLD-SCNC: 139 MMOL/L (ref 132–146)
SODIUM BLD-SCNC: 140 MMOL/L (ref 132–146)
SODIUM BLD-SCNC: 142 MMOL/L (ref 132–146)
SODIUM BLD-SCNC: 142 MMOL/L (ref 132–146)
SODIUM URINE: 42 MMOL/L
SPECIFIC GRAVITY UA: 1.01 (ref 1–1.03)
SPECIFIC GRAVITY UA: 1.02 (ref 1–1.03)
T4 FREE: 0.9 NG/DL (ref 0.93–1.7)
TARGET CELLS: ABNORMAL
TOTAL CK: 100 U/L (ref 20–200)
TOTAL CK: 31 U/L (ref 20–200)
TOTAL IRON BINDING CAPACITY: 162 MCG/DL (ref 250–450)
TOTAL PROTEIN: 4.5 G/DL (ref 6.4–8.3)
TOTAL PROTEIN: 4.6 G/DL (ref 6.4–8.3)
TOTAL PROTEIN: 4.6 G/DL (ref 6.4–8.3)
TOTAL PROTEIN: 4.7 G/DL (ref 6.4–8.3)
TOTAL PROTEIN: 4.9 G/DL (ref 6.4–8.3)
TOTAL PROTEIN: 5.9 G/DL (ref 6.4–8.3)
TOTAL PROTEIN: 7.5 G/DL (ref 6.4–8.3)
TROPONIN: 0.05 NG/ML (ref 0–0.03)
TROPONIN: 0.08 NG/ML (ref 0–0.03)
TROPONIN: 0.1 NG/ML (ref 0–0.03)
TSH SERPL DL<=0.05 MIU/L-ACNC: 2.3 UIU/ML (ref 0.27–4.2)
UREA NITROGEN, UR: 585 MG/DL (ref 800–1666)
URIC ACID, SERUM: 4.3 MG/DL (ref 3.4–7)
URIC ACID, SERUM: 5.2 MG/DL (ref 3.4–7)
URINE CULTURE, ROUTINE: NORMAL
URINE CULTURE, ROUTINE: NORMAL
UROBILINOGEN, URINE: 0.2 E.U./DL
UROBILINOGEN, URINE: 1 E.U./DL
VITAMIN B-12: 892 PG/ML (ref 211–946)
VITAMIN D 25-HYDROXY: <5 NG/ML (ref 30–100)
WBC # BLD: 11.4 E9/L (ref 4.5–11.5)
WBC # BLD: 12.6 E9/L (ref 4.5–11.5)
WBC # BLD: 13.1 E9/L (ref 4.5–11.5)
WBC # BLD: 13.9 E9/L (ref 4.5–11.5)
WBC # BLD: 14.8 E9/L (ref 4.5–11.5)
WBC # BLD: 7.5 E9/L (ref 4.5–11.5)
WBC # BLD: 7.9 E9/L (ref 4.5–11.5)
WBC # BLD: 8.2 E9/L (ref 4.5–11.5)
WBC # BLD: 8.4 E9/L (ref 4.5–11.5)
WBC # BLD: 9.4 E9/L (ref 4.5–11.5)
WBC # BLD: 9.7 E9/L (ref 4.5–11.5)
WBC UA: ABNORMAL /HPF (ref 0–5)
WBC UA: ABNORMAL /HPF (ref 0–5)
WOUND/ABSCESS: NORMAL

## 2018-01-01 PROCEDURE — 2580000003 HC RX 258: Performed by: INTERNAL MEDICINE

## 2018-01-01 PROCEDURE — 84550 ASSAY OF BLOOD/URIC ACID: CPT

## 2018-01-01 PROCEDURE — 84165 PROTEIN E-PHORESIS SERUM: CPT

## 2018-01-01 PROCEDURE — 84133 ASSAY OF URINE POTASSIUM: CPT

## 2018-01-01 PROCEDURE — 2060000000 HC ICU INTERMEDIATE R&B

## 2018-01-01 PROCEDURE — 85025 COMPLETE CBC W/AUTO DIFF WBC: CPT

## 2018-01-01 PROCEDURE — 86301 IMMUNOASSAY TUMOR CA 19-9: CPT

## 2018-01-01 PROCEDURE — 99232 SBSQ HOSP IP/OBS MODERATE 35: CPT | Performed by: INTERNAL MEDICINE

## 2018-01-01 PROCEDURE — 6360000002 HC RX W HCPCS: Performed by: ANESTHESIOLOGY

## 2018-01-01 PROCEDURE — 81001 URINALYSIS AUTO W/SCOPE: CPT

## 2018-01-01 PROCEDURE — 36415 COLL VENOUS BLD VENIPUNCTURE: CPT

## 2018-01-01 PROCEDURE — 85730 THROMBOPLASTIN TIME PARTIAL: CPT

## 2018-01-01 PROCEDURE — 6370000000 HC RX 637 (ALT 250 FOR IP): Performed by: INTERNAL MEDICINE

## 2018-01-01 PROCEDURE — 2580000003 HC RX 258: Performed by: EMERGENCY MEDICINE

## 2018-01-01 PROCEDURE — 83550 IRON BINDING TEST: CPT

## 2018-01-01 PROCEDURE — 93005 ELECTROCARDIOGRAM TRACING: CPT | Performed by: EMERGENCY MEDICINE

## 2018-01-01 PROCEDURE — 83605 ASSAY OF LACTIC ACID: CPT

## 2018-01-01 PROCEDURE — 71045 X-RAY EXAM CHEST 1 VIEW: CPT

## 2018-01-01 PROCEDURE — 83540 ASSAY OF IRON: CPT

## 2018-01-01 PROCEDURE — 93306 TTE W/DOPPLER COMPLETE: CPT

## 2018-01-01 PROCEDURE — 82150 ASSAY OF AMYLASE: CPT

## 2018-01-01 PROCEDURE — 7100000010 HC PHASE II RECOVERY - FIRST 15 MIN: Performed by: INTERNAL MEDICINE

## 2018-01-01 PROCEDURE — 80048 BASIC METABOLIC PNL TOTAL CA: CPT

## 2018-01-01 PROCEDURE — 80158 DRUG ASSAY CYCLOSPORINE: CPT

## 2018-01-01 PROCEDURE — 6370000000 HC RX 637 (ALT 250 FOR IP): Performed by: PODIATRIST

## 2018-01-01 PROCEDURE — 51702 INSERT TEMP BLADDER CATH: CPT

## 2018-01-01 PROCEDURE — 82330 ASSAY OF CALCIUM: CPT

## 2018-01-01 PROCEDURE — 84484 ASSAY OF TROPONIN QUANT: CPT

## 2018-01-01 PROCEDURE — 87205 SMEAR GRAM STAIN: CPT

## 2018-01-01 PROCEDURE — A0426 ALS 1: HCPCS

## 2018-01-01 PROCEDURE — 84166 PROTEIN E-PHORESIS/URINE/CSF: CPT

## 2018-01-01 PROCEDURE — 88112 CYTOPATH CELL ENHANCE TECH: CPT

## 2018-01-01 PROCEDURE — 87075 CULTR BACTERIA EXCEPT BLOOD: CPT

## 2018-01-01 PROCEDURE — 99283 EMERGENCY DEPT VISIT LOW MDM: CPT

## 2018-01-01 PROCEDURE — 3700000001 HC ADD 15 MINUTES (ANESTHESIA)

## 2018-01-01 PROCEDURE — 87149 DNA/RNA DIRECT PROBE: CPT

## 2018-01-01 PROCEDURE — 84100 ASSAY OF PHOSPHORUS: CPT

## 2018-01-01 PROCEDURE — BF11YZZ FLUOROSCOPY OF BILIARY AND PANCREATIC DUCTS USING OTHER CONTRAST: ICD-10-PCS | Performed by: INTERNAL MEDICINE

## 2018-01-01 PROCEDURE — 70450 CT HEAD/BRAIN W/O DYE: CPT

## 2018-01-01 PROCEDURE — 87070 CULTURE OTHR SPECIMN AEROBIC: CPT

## 2018-01-01 PROCEDURE — 6360000002 HC RX W HCPCS: Performed by: INTERNAL MEDICINE

## 2018-01-01 PROCEDURE — 93975 VASCULAR STUDY: CPT

## 2018-01-01 PROCEDURE — 80076 HEPATIC FUNCTION PANEL: CPT

## 2018-01-01 PROCEDURE — 83690 ASSAY OF LIPASE: CPT

## 2018-01-01 PROCEDURE — 6360000004 HC RX CONTRAST MEDICATION: Performed by: RADIOLOGY

## 2018-01-01 PROCEDURE — 6370000000 HC RX 637 (ALT 250 FOR IP): Performed by: NURSE PRACTITIONER

## 2018-01-01 PROCEDURE — C1769 GUIDE WIRE: HCPCS

## 2018-01-01 PROCEDURE — 84300 ASSAY OF URINE SODIUM: CPT

## 2018-01-01 PROCEDURE — 82553 CREATINE MB FRACTION: CPT

## 2018-01-01 PROCEDURE — 99232 SBSQ HOSP IP/OBS MODERATE 35: CPT | Performed by: SURGERY

## 2018-01-01 PROCEDURE — 82105 ALPHA-FETOPROTEIN SERUM: CPT

## 2018-01-01 PROCEDURE — 85610 PROTHROMBIN TIME: CPT

## 2018-01-01 PROCEDURE — 3700000000 HC ANESTHESIA ATTENDED CARE: Performed by: INTERNAL MEDICINE

## 2018-01-01 PROCEDURE — 2580000003 HC RX 258: Performed by: CLINICAL NURSE SPECIALIST

## 2018-01-01 PROCEDURE — 80053 COMPREHEN METABOLIC PANEL: CPT

## 2018-01-01 PROCEDURE — APPSS45 APP SPLIT SHARED TIME 31-45 MINUTES: Performed by: NURSE PRACTITIONER

## 2018-01-01 PROCEDURE — 73600 X-RAY EXAM OF ANKLE: CPT

## 2018-01-01 PROCEDURE — 6360000002 HC RX W HCPCS: Performed by: CLINICAL NURSE SPECIALIST

## 2018-01-01 PROCEDURE — 87040 BLOOD CULTURE FOR BACTERIA: CPT

## 2018-01-01 PROCEDURE — 85027 COMPLETE CBC AUTOMATED: CPT

## 2018-01-01 PROCEDURE — 82746 ASSAY OF FOLIC ACID SERUM: CPT

## 2018-01-01 PROCEDURE — 82533 TOTAL CORTISOL: CPT

## 2018-01-01 PROCEDURE — 87088 URINE BACTERIA CULTURE: CPT

## 2018-01-01 PROCEDURE — 83970 ASSAY OF PARATHORMONE: CPT

## 2018-01-01 PROCEDURE — 3700000001 HC ADD 15 MINUTES (ANESTHESIA): Performed by: INTERNAL MEDICINE

## 2018-01-01 PROCEDURE — 2500000003 HC RX 250 WO HCPCS: Performed by: INTERNAL MEDICINE

## 2018-01-01 PROCEDURE — 6370000000 HC RX 637 (ALT 250 FOR IP): Performed by: EMERGENCY MEDICINE

## 2018-01-01 PROCEDURE — 84439 ASSAY OF FREE THYROXINE: CPT

## 2018-01-01 PROCEDURE — 2500000003 HC RX 250 WO HCPCS: Performed by: RADIOLOGY

## 2018-01-01 PROCEDURE — 97165 OT EVAL LOW COMPLEX 30 MIN: CPT

## 2018-01-01 PROCEDURE — 97161 PT EVAL LOW COMPLEX 20 MIN: CPT

## 2018-01-01 PROCEDURE — 82550 ASSAY OF CK (CPK): CPT

## 2018-01-01 PROCEDURE — 99233 SBSQ HOSP IP/OBS HIGH 50: CPT | Performed by: INTERNAL MEDICINE

## 2018-01-01 PROCEDURE — 49083 ABD PARACENTESIS W/IMAGING: CPT

## 2018-01-01 PROCEDURE — 76700 US EXAM ABDOM COMPLETE: CPT

## 2018-01-01 PROCEDURE — 99223 1ST HOSP IP/OBS HIGH 75: CPT | Performed by: INTERNAL MEDICINE

## 2018-01-01 PROCEDURE — A0425 GROUND MILEAGE: HCPCS

## 2018-01-01 PROCEDURE — 99239 HOSP IP/OBS DSCHRG MGMT >30: CPT | Performed by: INTERNAL MEDICINE

## 2018-01-01 PROCEDURE — 82436 ASSAY OF URINE CHLORIDE: CPT

## 2018-01-01 PROCEDURE — 89051 BODY FLUID CELL COUNT: CPT

## 2018-01-01 PROCEDURE — 73630 X-RAY EXAM OF FOOT: CPT

## 2018-01-01 PROCEDURE — G8988 SELF CARE GOAL STATUS: HCPCS

## 2018-01-01 PROCEDURE — 6360000002 HC RX W HCPCS: Performed by: NURSE ANESTHETIST, CERTIFIED REGISTERED

## 2018-01-01 PROCEDURE — 94760 N-INVAS EAR/PLS OXIMETRY 1: CPT

## 2018-01-01 PROCEDURE — G8987 SELF CARE CURRENT STATUS: HCPCS

## 2018-01-01 PROCEDURE — 83880 ASSAY OF NATRIURETIC PEPTIDE: CPT

## 2018-01-01 PROCEDURE — 84540 ASSAY OF URINE/UREA-N: CPT

## 2018-01-01 PROCEDURE — 85651 RBC SED RATE NONAUTOMATED: CPT

## 2018-01-01 PROCEDURE — 82728 ASSAY OF FERRITIN: CPT

## 2018-01-01 PROCEDURE — 97530 THERAPEUTIC ACTIVITIES: CPT

## 2018-01-01 PROCEDURE — 74181 MRI ABDOMEN W/O CONTRAST: CPT

## 2018-01-01 PROCEDURE — 82306 VITAMIN D 25 HYDROXY: CPT

## 2018-01-01 PROCEDURE — 0FJD8ZZ INSPECTION OF PANCREATIC DUCT, VIA NATURAL OR ARTIFICIAL OPENING ENDOSCOPIC: ICD-10-PCS | Performed by: INTERNAL MEDICINE

## 2018-01-01 PROCEDURE — 2580000003 HC RX 258: Performed by: NURSE ANESTHETIST, CERTIFIED REGISTERED

## 2018-01-01 PROCEDURE — 6360000004 HC RX CONTRAST MEDICATION: Performed by: INTERNAL MEDICINE

## 2018-01-01 PROCEDURE — 86140 C-REACTIVE PROTEIN: CPT

## 2018-01-01 PROCEDURE — 99223 1ST HOSP IP/OBS HIGH 75: CPT | Performed by: TRANSPLANT SURGERY

## 2018-01-01 PROCEDURE — 82248 BILIRUBIN DIRECT: CPT

## 2018-01-01 PROCEDURE — 99285 EMERGENCY DEPT VISIT HI MDM: CPT

## 2018-01-01 PROCEDURE — 2500000003 HC RX 250 WO HCPCS: Performed by: NURSE ANESTHETIST, CERTIFIED REGISTERED

## 2018-01-01 PROCEDURE — 74176 CT ABD & PELVIS W/O CONTRAST: CPT

## 2018-01-01 PROCEDURE — 84443 ASSAY THYROID STIM HORMONE: CPT

## 2018-01-01 PROCEDURE — 3700000000 HC ANESTHESIA ATTENDED CARE

## 2018-01-01 PROCEDURE — 83735 ASSAY OF MAGNESIUM: CPT

## 2018-01-01 PROCEDURE — 3609014400 HC ERCP DIAGNOSTIC: Performed by: INTERNAL MEDICINE

## 2018-01-01 PROCEDURE — C1769 GUIDE WIRE: HCPCS | Performed by: INTERNAL MEDICINE

## 2018-01-01 PROCEDURE — 82607 VITAMIN B-12: CPT

## 2018-01-01 PROCEDURE — 86334 IMMUNOFIX E-PHORESIS SERUM: CPT

## 2018-01-01 PROCEDURE — 47534 PLMT BILIARY DRAINAGE CATH: CPT

## 2018-01-01 PROCEDURE — 74330 X-RAY BILE/PANC ENDOSCOPY: CPT

## 2018-01-01 PROCEDURE — 72170 X-RAY EXAM OF PELVIS: CPT

## 2018-01-01 PROCEDURE — BF14YZZ FLUOROSCOPY OF GALLBLADDER, BILE DUCTS AND PANCREATIC DUCTS USING OTHER CONTRAST: ICD-10-PCS | Performed by: RADIOLOGY

## 2018-01-01 PROCEDURE — 82378 CARCINOEMBRYONIC ANTIGEN: CPT

## 2018-01-01 PROCEDURE — 88305 TISSUE EXAM BY PATHOLOGIST: CPT

## 2018-01-01 PROCEDURE — 99222 1ST HOSP IP/OBS MODERATE 55: CPT | Performed by: INTERNAL MEDICINE

## 2018-01-01 PROCEDURE — 7100000011 HC PHASE II RECOVERY - ADDTL 15 MIN: Performed by: INTERNAL MEDICINE

## 2018-01-01 PROCEDURE — 6360000004 HC RX CONTRAST MEDICATION: Performed by: NURSE PRACTITIONER

## 2018-01-01 PROCEDURE — 0F9530Z DRAINAGE OF RIGHT HEPATIC DUCT WITH DRAINAGE DEVICE, PERCUTANEOUS APPROACH: ICD-10-PCS | Performed by: RADIOLOGY

## 2018-01-01 RX ORDER — MORPHINE SULFATE 2 MG/ML
1 INJECTION, SOLUTION INTRAMUSCULAR; INTRAVENOUS EVERY 5 MIN PRN
Status: DISCONTINUED | OUTPATIENT
Start: 2018-01-01 | End: 2018-01-01

## 2018-01-01 RX ORDER — SODIUM CHLORIDE 9 MG/ML
INJECTION, SOLUTION INTRAVENOUS CONTINUOUS PRN
Status: DISCONTINUED | OUTPATIENT
Start: 2018-01-01 | End: 2018-01-01 | Stop reason: SDUPTHER

## 2018-01-01 RX ORDER — NITROFURANTOIN 25; 75 MG/1; MG/1
100 CAPSULE ORAL EVERY 12 HOURS SCHEDULED
Status: DISCONTINUED | OUTPATIENT
Start: 2018-01-01 | End: 2018-01-01 | Stop reason: HOSPADM

## 2018-01-01 RX ORDER — PROMETHAZINE HYDROCHLORIDE 25 MG/ML
6.25 INJECTION, SOLUTION INTRAMUSCULAR; INTRAVENOUS EVERY 10 MIN PRN
Status: DISCONTINUED | OUTPATIENT
Start: 2018-01-01 | End: 2018-01-01 | Stop reason: HOSPADM

## 2018-01-01 RX ORDER — CHOLECALCIFEROL (VITAMIN D3) 50 MCG
5000 TABLET ORAL DAILY
Status: DISCONTINUED | OUTPATIENT
Start: 2018-01-01 | End: 2018-01-01 | Stop reason: HOSPADM

## 2018-01-01 RX ORDER — SODIUM CHLORIDE 0.9 % (FLUSH) 0.9 %
10 SYRINGE (ML) INJECTION EVERY 12 HOURS SCHEDULED
Status: DISCONTINUED | OUTPATIENT
Start: 2018-01-01 | End: 2018-01-01 | Stop reason: HOSPADM

## 2018-01-01 RX ORDER — SODIUM CHLORIDE 0.9 % (FLUSH) 0.9 %
10 SYRINGE (ML) INJECTION PRN
Status: DISCONTINUED | OUTPATIENT
Start: 2018-01-01 | End: 2018-01-01 | Stop reason: HOSPADM

## 2018-01-01 RX ORDER — ALLOPURINOL 300 MG/1
300 TABLET ORAL DAILY
Qty: 90 TABLET | Refills: 0 | Status: ON HOLD | OUTPATIENT
Start: 2018-01-01 | End: 2018-01-01 | Stop reason: HOSPADM

## 2018-01-01 RX ORDER — 0.9 % SODIUM CHLORIDE 0.9 %
500 INTRAVENOUS SOLUTION INTRAVENOUS ONCE
Status: COMPLETED | OUTPATIENT
Start: 2018-01-01 | End: 2018-01-01

## 2018-01-01 RX ORDER — GLYCOPYRROLATE 0.2 MG/ML
0.1 INJECTION INTRAMUSCULAR; INTRAVENOUS EVERY 4 HOURS
Status: DISCONTINUED | OUTPATIENT
Start: 2018-01-01 | End: 2018-01-01 | Stop reason: HOSPADM

## 2018-01-01 RX ORDER — MUPIROCIN CALCIUM 20 MG/G
CREAM TOPICAL DAILY
Status: DISCONTINUED | OUTPATIENT
Start: 2018-01-01 | End: 2018-01-01 | Stop reason: HOSPADM

## 2018-01-01 RX ORDER — 0.9 % SODIUM CHLORIDE 0.9 %
30 INTRAVENOUS SOLUTION INTRAVENOUS ONCE
Status: COMPLETED | OUTPATIENT
Start: 2018-01-01 | End: 2018-01-01

## 2018-01-01 RX ORDER — MIDODRINE HYDROCHLORIDE 5 MG/1
10 TABLET ORAL
Status: DISCONTINUED | OUTPATIENT
Start: 2018-01-01 | End: 2018-01-01 | Stop reason: HOSPADM

## 2018-01-01 RX ORDER — PROPOFOL 10 MG/ML
INJECTION, EMULSION INTRAVENOUS CONTINUOUS PRN
Status: DISCONTINUED | OUTPATIENT
Start: 2018-01-01 | End: 2018-01-01 | Stop reason: SDUPTHER

## 2018-01-01 RX ORDER — CYCLOSPORINE 100 MG/1
100 CAPSULE, LIQUID FILLED ORAL NIGHTLY
Status: DISCONTINUED | OUTPATIENT
Start: 2018-01-01 | End: 2018-01-01 | Stop reason: HOSPADM

## 2018-01-01 RX ORDER — NITROFURANTOIN MACROCRYSTALS 100 MG/1
100 CAPSULE ORAL 2 TIMES DAILY
Status: COMPLETED | OUTPATIENT
Start: 2018-01-01 | End: 2018-01-01

## 2018-01-01 RX ORDER — CYCLOSPORINE 100 MG/1
100 CAPSULE, LIQUID FILLED ORAL DAILY
Status: DISCONTINUED | OUTPATIENT
Start: 2018-01-01 | End: 2018-01-01

## 2018-01-01 RX ORDER — ATENOLOL 25 MG/1
25 TABLET ORAL 2 TIMES DAILY
Qty: 180 TABLET | Refills: 0 | Status: ON HOLD | OUTPATIENT
Start: 2018-01-01 | End: 2018-01-01 | Stop reason: HOSPADM

## 2018-01-01 RX ORDER — MIDODRINE HYDROCHLORIDE 5 MG/1
10 TABLET ORAL
Status: DISCONTINUED | OUTPATIENT
Start: 2018-01-01 | End: 2018-01-01

## 2018-01-01 RX ORDER — MIDODRINE HYDROCHLORIDE 5 MG/1
5 TABLET ORAL
Status: DISCONTINUED | OUTPATIENT
Start: 2018-01-01 | End: 2018-01-01

## 2018-01-01 RX ORDER — ONDANSETRON 2 MG/ML
4 INJECTION INTRAMUSCULAR; INTRAVENOUS EVERY 6 HOURS PRN
Status: DISCONTINUED | OUTPATIENT
Start: 2018-01-01 | End: 2018-01-01 | Stop reason: HOSPADM

## 2018-01-01 RX ORDER — SODIUM CHLORIDE 9 MG/ML
300 INJECTION, SOLUTION INTRAVENOUS ONCE
Status: COMPLETED | OUTPATIENT
Start: 2018-01-01 | End: 2018-01-01

## 2018-01-01 RX ORDER — MEPERIDINE HYDROCHLORIDE 25 MG/ML
12.5 INJECTION INTRAMUSCULAR; INTRAVENOUS; SUBCUTANEOUS EVERY 5 MIN PRN
Status: DISCONTINUED | OUTPATIENT
Start: 2018-01-01 | End: 2018-01-01 | Stop reason: HOSPADM

## 2018-01-01 RX ORDER — PRAVASTATIN SODIUM 40 MG
40 TABLET ORAL DAILY
Qty: 90 TABLET | Refills: 0 | Status: ON HOLD | OUTPATIENT
Start: 2018-01-01 | End: 2018-01-01 | Stop reason: HOSPADM

## 2018-01-01 RX ORDER — PRAVASTATIN SODIUM 20 MG
40 TABLET ORAL DAILY
Status: DISCONTINUED | OUTPATIENT
Start: 2018-01-01 | End: 2018-01-01 | Stop reason: HOSPADM

## 2018-01-01 RX ORDER — ALLOPURINOL 300 MG/1
300 TABLET ORAL DAILY
Status: DISCONTINUED | OUTPATIENT
Start: 2018-01-01 | End: 2018-01-01 | Stop reason: HOSPADM

## 2018-01-01 RX ORDER — CEFAZOLIN SODIUM 1 G/3ML
INJECTION, POWDER, FOR SOLUTION INTRAMUSCULAR; INTRAVENOUS PRN
Status: DISCONTINUED | OUTPATIENT
Start: 2018-01-01 | End: 2018-01-01 | Stop reason: SDUPTHER

## 2018-01-01 RX ORDER — MIDAZOLAM HYDROCHLORIDE 1 MG/ML
INJECTION INTRAMUSCULAR; INTRAVENOUS PRN
Status: DISCONTINUED | OUTPATIENT
Start: 2018-01-01 | End: 2018-01-01 | Stop reason: SDUPTHER

## 2018-01-01 RX ORDER — DOCUSATE SODIUM 100 MG/1
100 CAPSULE, LIQUID FILLED ORAL DAILY
Status: DISCONTINUED | OUTPATIENT
Start: 2018-01-01 | End: 2018-01-01 | Stop reason: HOSPADM

## 2018-01-01 RX ORDER — MIDODRINE HYDROCHLORIDE 5 MG/1
5 TABLET ORAL 2 TIMES DAILY WITH MEALS
Status: DISCONTINUED | OUTPATIENT
Start: 2018-01-01 | End: 2018-01-01

## 2018-01-01 RX ORDER — KETAMINE HYDROCHLORIDE 10 MG/ML
INJECTION, SOLUTION INTRAMUSCULAR; INTRAVENOUS PRN
Status: DISCONTINUED | OUTPATIENT
Start: 2018-01-01 | End: 2018-01-01 | Stop reason: SDUPTHER

## 2018-01-01 RX ORDER — HYDRALAZINE HYDROCHLORIDE 10 MG/1
10 TABLET, FILM COATED ORAL DAILY
Qty: 90 TABLET | Refills: 0 | Status: ON HOLD | OUTPATIENT
Start: 2018-01-01 | End: 2018-01-01 | Stop reason: HOSPADM

## 2018-01-01 RX ORDER — FINASTERIDE 5 MG/1
5 TABLET, FILM COATED ORAL DAILY
Qty: 90 TABLET | Refills: 0 | Status: ON HOLD | OUTPATIENT
Start: 2018-01-01 | End: 2018-01-01 | Stop reason: HOSPADM

## 2018-01-01 RX ORDER — MORPHINE SULFATE 2 MG/ML
1 INJECTION, SOLUTION INTRAMUSCULAR; INTRAVENOUS
Status: DISCONTINUED | OUTPATIENT
Start: 2018-01-01 | End: 2018-01-01 | Stop reason: HOSPADM

## 2018-01-01 RX ORDER — HYDROCODONE BITARTRATE AND ACETAMINOPHEN 5; 325 MG/1; MG/1
1 TABLET ORAL PRN
Status: ACTIVE | OUTPATIENT
Start: 2018-01-01 | End: 2018-01-01

## 2018-01-01 RX ORDER — MORPHINE SULFATE 2 MG/ML
2 INJECTION, SOLUTION INTRAMUSCULAR; INTRAVENOUS EVERY 5 MIN PRN
Status: DISCONTINUED | OUTPATIENT
Start: 2018-01-01 | End: 2018-01-01

## 2018-01-01 RX ORDER — NITROFURANTOIN MACROCRYSTALS 100 MG/1
100 CAPSULE ORAL 2 TIMES DAILY
Status: ON HOLD | COMMUNITY
End: 2018-01-01 | Stop reason: HOSPADM

## 2018-01-01 RX ORDER — FUROSEMIDE 40 MG/1
40 TABLET ORAL 2 TIMES DAILY
Status: DISCONTINUED | OUTPATIENT
Start: 2018-01-01 | End: 2018-01-01

## 2018-01-01 RX ORDER — PROPOFOL 10 MG/ML
INJECTION, EMULSION INTRAVENOUS PRN
Status: DISCONTINUED | OUTPATIENT
Start: 2018-01-01 | End: 2018-01-01 | Stop reason: SDUPTHER

## 2018-01-01 RX ORDER — OYSTER SHELL CALCIUM WITH VITAMIN D 500; 200 MG/1; [IU]/1
1 TABLET, FILM COATED ORAL 2 TIMES DAILY
Status: DISCONTINUED | OUTPATIENT
Start: 2018-01-01 | End: 2018-01-01 | Stop reason: HOSPADM

## 2018-01-01 RX ORDER — NITROFURANTOIN 25; 75 MG/1; MG/1
100 CAPSULE ORAL 2 TIMES DAILY
Qty: 14 CAPSULE | Refills: 0 | Status: SHIPPED | OUTPATIENT
Start: 2018-01-01 | End: 2018-01-01

## 2018-01-01 RX ORDER — HYDROCODONE BITARTRATE AND ACETAMINOPHEN 5; 325 MG/1; MG/1
2 TABLET ORAL PRN
Status: ACTIVE | OUTPATIENT
Start: 2018-01-01 | End: 2018-01-01

## 2018-01-01 RX ORDER — FENTANYL CITRATE 50 UG/ML
INJECTION, SOLUTION INTRAMUSCULAR; INTRAVENOUS PRN
Status: DISCONTINUED | OUTPATIENT
Start: 2018-01-01 | End: 2018-01-01 | Stop reason: SDUPTHER

## 2018-01-01 RX ORDER — LORAZEPAM 2 MG/ML
0.5 INJECTION INTRAMUSCULAR EVERY 4 HOURS
Status: DISCONTINUED | OUTPATIENT
Start: 2018-01-01 | End: 2018-01-01 | Stop reason: HOSPADM

## 2018-01-01 RX ORDER — SODIUM CHLORIDE 9 MG/ML
INJECTION, SOLUTION INTRAVENOUS CONTINUOUS
Status: DISCONTINUED | OUTPATIENT
Start: 2018-01-01 | End: 2018-01-01

## 2018-01-01 RX ORDER — FINASTERIDE 5 MG/1
5 TABLET, FILM COATED ORAL DAILY
Status: DISCONTINUED | OUTPATIENT
Start: 2018-01-01 | End: 2018-01-01 | Stop reason: HOSPADM

## 2018-01-01 RX ORDER — DOCUSATE SODIUM 100 MG/1
100 CAPSULE, LIQUID FILLED ORAL DAILY PRN
Status: DISCONTINUED | OUTPATIENT
Start: 2018-01-01 | End: 2018-01-01

## 2018-01-01 RX ADMIN — Medication 10 ML: at 21:11

## 2018-01-01 RX ADMIN — MIDODRINE HYDROCHLORIDE 10 MG: 5 TABLET ORAL at 17:08

## 2018-01-01 RX ADMIN — MORPHINE SULFATE 1 MG: 2 INJECTION, SOLUTION INTRAMUSCULAR; INTRAVENOUS at 08:25

## 2018-01-01 RX ADMIN — CALCIUM CARBONATE-VITAMIN D TAB 500 MG-200 UNIT 1 TABLET: 500-200 TAB at 09:26

## 2018-01-01 RX ADMIN — DOCUSATE SODIUM 100 MG: 100 CAPSULE, LIQUID FILLED ORAL at 08:49

## 2018-01-01 RX ADMIN — CALCIUM CARBONATE-VITAMIN D TAB 500 MG-200 UNIT 1 TABLET: 500-200 TAB at 08:24

## 2018-01-01 RX ADMIN — MUPIROCIN: 2 CREAM TOPICAL at 12:52

## 2018-01-01 RX ADMIN — GLYCOPYRROLATE 0.1 MG: 0.2 INJECTION, SOLUTION INTRAMUSCULAR; INTRAVENOUS at 10:35

## 2018-01-01 RX ADMIN — FENTANYL CITRATE 50 MCG: 50 INJECTION, SOLUTION INTRAMUSCULAR; INTRAVENOUS at 13:26

## 2018-01-01 RX ADMIN — FENTANYL CITRATE 50 MCG: 50 INJECTION, SOLUTION INTRAMUSCULAR; INTRAVENOUS at 13:10

## 2018-01-01 RX ADMIN — FINASTERIDE 5 MG: 5 TABLET, FILM COATED ORAL at 08:13

## 2018-01-01 RX ADMIN — PETROLATUM: 42 OINTMENT TOPICAL at 11:19

## 2018-01-01 RX ADMIN — DOCUSATE SODIUM 100 MG: 100 CAPSULE, LIQUID FILLED ORAL at 08:24

## 2018-01-01 RX ADMIN — ALLOPURINOL 300 MG: 300 TABLET ORAL at 09:50

## 2018-01-01 RX ADMIN — CYCLOSPORINE 100 MG: 100 CAPSULE, LIQUID FILLED ORAL at 20:00

## 2018-01-01 RX ADMIN — PHENYLEPHRINE HYDROCHLORIDE 100 MCG: 10 INJECTION INTRAMUSCULAR; INTRAVENOUS; SUBCUTANEOUS at 15:23

## 2018-01-01 RX ADMIN — Medication 5000 UNITS: at 09:26

## 2018-01-01 RX ADMIN — PHENYLEPHRINE HYDROCHLORIDE 100 MCG: 10 INJECTION INTRAMUSCULAR; INTRAVENOUS; SUBCUTANEOUS at 19:00

## 2018-01-01 RX ADMIN — NITROFURANTOIN MACROCRYSTALS 100 MG: 100 CAPSULE ORAL at 20:00

## 2018-01-01 RX ADMIN — MIDAZOLAM HYDROCHLORIDE 1 MG: 1 INJECTION, SOLUTION INTRAMUSCULAR; INTRAVENOUS at 15:19

## 2018-01-01 RX ADMIN — PRAVASTATIN SODIUM 40 MG: 20 TABLET ORAL at 08:12

## 2018-01-01 RX ADMIN — MIDODRINE HYDROCHLORIDE 10 MG: 5 TABLET ORAL at 13:42

## 2018-01-01 RX ADMIN — PETROLATUM: 42 OINTMENT TOPICAL at 08:49

## 2018-01-01 RX ADMIN — CEFAZOLIN 2000 MG: 1 INJECTION, POWDER, FOR SOLUTION INTRAMUSCULAR; INTRAVENOUS; PARENTERAL at 15:26

## 2018-01-01 RX ADMIN — PHENYLEPHRINE HYDROCHLORIDE 100 MCG: 10 INJECTION INTRAMUSCULAR; INTRAVENOUS; SUBCUTANEOUS at 20:09

## 2018-01-01 RX ADMIN — PROPOFOL 50 MG: 10 INJECTION, EMULSION INTRAVENOUS at 13:34

## 2018-01-01 RX ADMIN — NITROFURANTOIN MACROCRYSTALS 100 MG: 100 CAPSULE ORAL at 08:25

## 2018-01-01 RX ADMIN — CALCIUM CARBONATE-VITAMIN D TAB 500 MG-200 UNIT 1 TABLET: 500-200 TAB at 20:00

## 2018-01-01 RX ADMIN — SODIUM CHLORIDE: 9 INJECTION, SOLUTION INTRAVENOUS at 08:24

## 2018-01-01 RX ADMIN — PETROLATUM: 42 OINTMENT TOPICAL at 22:20

## 2018-01-01 RX ADMIN — PROPOFOL 50 MG: 10 INJECTION, EMULSION INTRAVENOUS at 12:51

## 2018-01-01 RX ADMIN — AMPICILLIN AND SULBACTAM 3 G: 2; 1 INJECTION, POWDER, FOR SOLUTION INTRAVENOUS at 11:31

## 2018-01-01 RX ADMIN — PRAVASTATIN SODIUM 40 MG: 20 TABLET ORAL at 08:25

## 2018-01-01 RX ADMIN — NITROFURANTOIN MACROCRYSTALS 100 MG: 100 CAPSULE ORAL at 09:26

## 2018-01-01 RX ADMIN — NITROFURANTOIN MACROCRYSTALS 100 MG: 100 CAPSULE ORAL at 08:12

## 2018-01-01 RX ADMIN — Medication 10 ML: at 09:50

## 2018-01-01 RX ADMIN — SODIUM CHLORIDE 500 ML: 9 INJECTION, SOLUTION INTRAVENOUS at 12:00

## 2018-01-01 RX ADMIN — SODIUM CHLORIDE: 9 INJECTION, SOLUTION INTRAVENOUS at 18:30

## 2018-01-01 RX ADMIN — MIDAZOLAM HYDROCHLORIDE 1 MG: 1 INJECTION, SOLUTION INTRAMUSCULAR; INTRAVENOUS at 19:43

## 2018-01-01 RX ADMIN — DOCUSATE SODIUM 100 MG: 100 CAPSULE, LIQUID FILLED ORAL at 09:50

## 2018-01-01 RX ADMIN — KETAMINE HYDROCHLORIDE 10 MG: 10 INJECTION INTRAMUSCULAR; INTRAVENOUS at 19:41

## 2018-01-01 RX ADMIN — CALCIUM CARBONATE-VITAMIN D TAB 500 MG-200 UNIT 1 TABLET: 500-200 TAB at 09:54

## 2018-01-01 RX ADMIN — Medication 10 ML: at 08:12

## 2018-01-01 RX ADMIN — Medication 5000 UNITS: at 09:54

## 2018-01-01 RX ADMIN — Medication 5000 UNITS: at 09:50

## 2018-01-01 RX ADMIN — KETAMINE HYDROCHLORIDE 20 MG: 10 INJECTION INTRAMUSCULAR; INTRAVENOUS at 16:35

## 2018-01-01 RX ADMIN — SODIUM CHLORIDE: 9 INJECTION, SOLUTION INTRAVENOUS at 12:48

## 2018-01-01 RX ADMIN — KETAMINE HYDROCHLORIDE 10 MG: 10 INJECTION INTRAMUSCULAR; INTRAVENOUS at 18:25

## 2018-01-01 RX ADMIN — CYCLOSPORINE 100 MG: 100 CAPSULE, LIQUID FILLED ORAL at 20:15

## 2018-01-01 RX ADMIN — ALLOPURINOL 300 MG: 300 TABLET ORAL at 09:54

## 2018-01-01 RX ADMIN — KETAMINE HYDROCHLORIDE 20 MG: 10 INJECTION INTRAMUSCULAR; INTRAVENOUS at 15:17

## 2018-01-01 RX ADMIN — PHENYLEPHRINE HYDROCHLORIDE 100 MCG: 10 INJECTION INTRAMUSCULAR; INTRAVENOUS; SUBCUTANEOUS at 17:15

## 2018-01-01 RX ADMIN — NITROFURANTOIN MACROCRYSTALS 100 MG: 100 CAPSULE ORAL at 20:15

## 2018-01-01 RX ADMIN — PHENYLEPHRINE HYDROCHLORIDE 200 MCG: 10 INJECTION INTRAMUSCULAR; INTRAVENOUS; SUBCUTANEOUS at 19:53

## 2018-01-01 RX ADMIN — KETAMINE HYDROCHLORIDE 20 MG: 10 INJECTION INTRAMUSCULAR; INTRAVENOUS at 16:09

## 2018-01-01 RX ADMIN — PHENYLEPHRINE HYDROCHLORIDE 100 MCG: 10 INJECTION INTRAVENOUS at 13:04

## 2018-01-01 RX ADMIN — ALLOPURINOL 300 MG: 300 TABLET ORAL at 08:12

## 2018-01-01 RX ADMIN — Medication 5000 UNITS: at 15:46

## 2018-01-01 RX ADMIN — ALLOPURINOL 300 MG: 300 TABLET ORAL at 09:26

## 2018-01-01 RX ADMIN — Medication 10 ML: at 08:15

## 2018-01-01 RX ADMIN — LIDOCAINE HYDROCHLORIDE 30 ML: 20 INJECTION, SOLUTION INFILTRATION; PERINEURAL at 20:48

## 2018-01-01 RX ADMIN — LORAZEPAM 0.5 MG: 2 INJECTION INTRAMUSCULAR; INTRAVENOUS at 10:35

## 2018-01-01 RX ADMIN — Medication 10 ML: at 09:12

## 2018-01-01 RX ADMIN — ALLOPURINOL 300 MG: 300 TABLET ORAL at 08:25

## 2018-01-01 RX ADMIN — DOCUSATE SODIUM 100 MG: 100 CAPSULE, LIQUID FILLED ORAL at 14:26

## 2018-01-01 RX ADMIN — PROPOFOL 50 MG: 10 INJECTION, EMULSION INTRAVENOUS at 13:03

## 2018-01-01 RX ADMIN — SODIUM CHLORIDE: 9 INJECTION, SOLUTION INTRAVENOUS at 00:27

## 2018-01-01 RX ADMIN — PETROLATUM: 42 OINTMENT TOPICAL at 20:51

## 2018-01-01 RX ADMIN — CYCLOSPORINE 100 MG: 100 CAPSULE, LIQUID FILLED ORAL at 20:51

## 2018-01-01 RX ADMIN — PHENYLEPHRINE HYDROCHLORIDE 100 MCG: 10 INJECTION INTRAMUSCULAR; INTRAVENOUS; SUBCUTANEOUS at 18:03

## 2018-01-01 RX ADMIN — Medication 10 ML: at 20:15

## 2018-01-01 RX ADMIN — ALLOPURINOL 300 MG: 300 TABLET ORAL at 08:49

## 2018-01-01 RX ADMIN — CEFAZOLIN 2000 MG: 1 INJECTION, POWDER, FOR SOLUTION INTRAMUSCULAR; INTRAVENOUS; PARENTERAL at 19:48

## 2018-01-01 RX ADMIN — FINASTERIDE 5 MG: 5 TABLET, FILM COATED ORAL at 09:26

## 2018-01-01 RX ADMIN — NITROFURANTOIN MACROCRYSTALS 100 MG: 100 CAPSULE ORAL at 20:47

## 2018-01-01 RX ADMIN — IOVERSOL 70 ML: 678 INJECTION INTRA-ARTERIAL; INTRAVENOUS at 20:48

## 2018-01-01 RX ADMIN — PRAVASTATIN SODIUM 40 MG: 20 TABLET ORAL at 08:13

## 2018-01-01 RX ADMIN — Medication 5000 UNITS: at 08:24

## 2018-01-01 RX ADMIN — CYCLOSPORINE 100 MG: 100 CAPSULE, LIQUID FILLED ORAL at 00:35

## 2018-01-01 RX ADMIN — FINASTERIDE 5 MG: 5 TABLET, FILM COATED ORAL at 09:51

## 2018-01-01 RX ADMIN — SODIUM CHLORIDE 500 ML: 9 INJECTION, SOLUTION INTRAVENOUS at 20:00

## 2018-01-01 RX ADMIN — IOPAMIDOL 9 ML: 755 INJECTION, SOLUTION INTRAVENOUS at 14:14

## 2018-01-01 RX ADMIN — PRAVASTATIN SODIUM 40 MG: 20 TABLET ORAL at 08:48

## 2018-01-01 RX ADMIN — ALLOPURINOL 300 MG: 300 TABLET ORAL at 08:24

## 2018-01-01 RX ADMIN — FINASTERIDE 5 MG: 5 TABLET, FILM COATED ORAL at 08:24

## 2018-01-01 RX ADMIN — NITROFURANTOIN (MONOHYDRATE/MACROCRYSTALS) 100 MG: 75; 25 CAPSULE ORAL at 20:00

## 2018-01-01 RX ADMIN — CALCIUM CARBONATE-VITAMIN D TAB 500 MG-200 UNIT 1 TABLET: 500-200 TAB at 08:49

## 2018-01-01 RX ADMIN — FINASTERIDE 5 MG: 5 TABLET, FILM COATED ORAL at 08:49

## 2018-01-01 RX ADMIN — FINASTERIDE 5 MG: 5 TABLET, FILM COATED ORAL at 08:25

## 2018-01-01 RX ADMIN — SODIUM CHLORIDE 300 ML: 9 INJECTION, SOLUTION INTRAVENOUS at 08:24

## 2018-01-01 RX ADMIN — PRAVASTATIN SODIUM 40 MG: 20 TABLET ORAL at 08:24

## 2018-01-01 RX ADMIN — CYCLOSPORINE 100 MG: 100 CAPSULE, LIQUID FILLED ORAL at 21:37

## 2018-01-01 RX ADMIN — PHENYLEPHRINE HYDROCHLORIDE 50 MCG: 10 INJECTION INTRAMUSCULAR; INTRAVENOUS; SUBCUTANEOUS at 15:11

## 2018-01-01 RX ADMIN — NITROFURANTOIN MACROCRYSTALS 100 MG: 100 CAPSULE ORAL at 00:17

## 2018-01-01 RX ADMIN — SODIUM CHLORIDE: 9 INJECTION, SOLUTION INTRAVENOUS at 17:07

## 2018-01-01 RX ADMIN — Medication 10 ML: at 08:24

## 2018-01-01 RX ADMIN — MIDAZOLAM HYDROCHLORIDE 1 MG: 1 INJECTION, SOLUTION INTRAMUSCULAR; INTRAVENOUS at 19:50

## 2018-01-01 RX ADMIN — Medication 10 ML: at 20:50

## 2018-01-01 RX ADMIN — FINASTERIDE 5 MG: 5 TABLET, FILM COATED ORAL at 08:12

## 2018-01-01 RX ADMIN — PROPOFOL 50 MG: 10 INJECTION, EMULSION INTRAVENOUS at 13:19

## 2018-01-01 RX ADMIN — FUROSEMIDE 40 MG: 40 TABLET ORAL at 08:24

## 2018-01-01 RX ADMIN — MIDODRINE HYDROCHLORIDE 10 MG: 5 TABLET ORAL at 08:47

## 2018-01-01 RX ADMIN — SODIUM CHLORIDE: 9 INJECTION, SOLUTION INTRAVENOUS at 14:52

## 2018-01-01 RX ADMIN — MIDAZOLAM HYDROCHLORIDE 1 MG: 1 INJECTION, SOLUTION INTRAMUSCULAR; INTRAVENOUS at 15:03

## 2018-01-01 RX ADMIN — MIDODRINE HYDROCHLORIDE 5 MG: 5 TABLET ORAL at 08:23

## 2018-01-01 RX ADMIN — CYCLOSPORINE 100 MG: 100 CAPSULE, LIQUID FILLED ORAL at 21:11

## 2018-01-01 RX ADMIN — SODIUM CHLORIDE: 9 INJECTION, SOLUTION INTRAVENOUS at 22:31

## 2018-01-01 RX ADMIN — KETAMINE HYDROCHLORIDE 20 MG: 10 INJECTION INTRAMUSCULAR; INTRAVENOUS at 15:34

## 2018-01-01 RX ADMIN — PHENYLEPHRINE HYDROCHLORIDE 100 MCG: 10 INJECTION INTRAMUSCULAR; INTRAVENOUS; SUBCUTANEOUS at 18:15

## 2018-01-01 RX ADMIN — CALCIUM CARBONATE-VITAMIN D TAB 500 MG-200 UNIT 1 TABLET: 500-200 TAB at 20:47

## 2018-01-01 RX ADMIN — CALCIUM CARBONATE-VITAMIN D TAB 500 MG-200 UNIT 1 TABLET: 500-200 TAB at 10:21

## 2018-01-01 RX ADMIN — CYCLOSPORINE 100 MG: 100 CAPSULE, LIQUID FILLED ORAL at 20:47

## 2018-01-01 RX ADMIN — MIDODRINE HYDROCHLORIDE 5 MG: 5 TABLET ORAL at 10:25

## 2018-01-01 RX ADMIN — PRAVASTATIN SODIUM 40 MG: 20 TABLET ORAL at 09:54

## 2018-01-01 RX ADMIN — SODIUM CHLORIDE: 9 INJECTION, SOLUTION INTRAVENOUS at 08:26

## 2018-01-01 RX ADMIN — PRAVASTATIN SODIUM 40 MG: 20 TABLET ORAL at 09:26

## 2018-01-01 RX ADMIN — PRAVASTATIN SODIUM 40 MG: 20 TABLET ORAL at 09:49

## 2018-01-01 RX ADMIN — Medication 10 ML: at 09:25

## 2018-01-01 RX ADMIN — KETAMINE HYDROCHLORIDE 20 MG: 10 INJECTION INTRAMUSCULAR; INTRAVENOUS at 18:45

## 2018-01-01 RX ADMIN — Medication 10 ML: at 00:18

## 2018-01-01 RX ADMIN — CALCIUM CARBONATE-VITAMIN D TAB 500 MG-200 UNIT 1 TABLET: 500-200 TAB at 09:50

## 2018-01-01 RX ADMIN — FINASTERIDE 5 MG: 5 TABLET, FILM COATED ORAL at 09:54

## 2018-01-01 RX ADMIN — PROPOFOL 30 MG: 10 INJECTION, EMULSION INTRAVENOUS at 13:37

## 2018-01-01 RX ADMIN — CALCIUM CARBONATE-VITAMIN D TAB 500 MG-200 UNIT 1 TABLET: 500-200 TAB at 20:51

## 2018-01-01 RX ADMIN — CALCIUM CARBONATE-VITAMIN D TAB 500 MG-200 UNIT 1 TABLET: 500-200 TAB at 21:11

## 2018-01-01 RX ADMIN — INDOMETHACIN 100 MG: 50 SUPPOSITORY RECTAL at 11:35

## 2018-01-01 RX ADMIN — SODIUM CHLORIDE: 9 INJECTION, SOLUTION INTRAVENOUS at 10:06

## 2018-01-01 RX ADMIN — SODIUM CHLORIDE: 9 INJECTION, SOLUTION INTRAVENOUS at 13:43

## 2018-01-01 RX ADMIN — FUROSEMIDE 40 MG: 40 TABLET ORAL at 17:05

## 2018-01-01 RX ADMIN — PHENYLEPHRINE HYDROCHLORIDE 100 MCG: 10 INJECTION INTRAMUSCULAR; INTRAVENOUS; SUBCUTANEOUS at 15:50

## 2018-01-01 RX ADMIN — SODIUM CHLORIDE: 9 INJECTION, SOLUTION INTRAVENOUS at 12:09

## 2018-01-01 RX ADMIN — PROPOFOL 20 MCG/KG/MIN: 10 INJECTION, EMULSION INTRAVENOUS at 15:03

## 2018-01-01 RX ADMIN — SODIUM CHLORIDE 500 ML: 9 INJECTION, SOLUTION INTRAVENOUS at 10:00

## 2018-01-01 RX ADMIN — KETAMINE HYDROCHLORIDE 20 MG: 10 INJECTION INTRAMUSCULAR; INTRAVENOUS at 19:15

## 2018-01-01 RX ADMIN — CALCIUM CARBONATE-VITAMIN D TAB 500 MG-200 UNIT 1 TABLET: 500-200 TAB at 21:37

## 2018-01-01 RX ADMIN — ALLOPURINOL 300 MG: 300 TABLET ORAL at 08:13

## 2018-01-01 RX ADMIN — SODIUM CHLORIDE 3564 ML: 9 INJECTION, SOLUTION INTRAVENOUS at 16:23

## 2018-01-01 RX ADMIN — KETAMINE HYDROCHLORIDE 20 MG: 10 INJECTION INTRAMUSCULAR; INTRAVENOUS at 17:11

## 2018-01-01 RX ADMIN — Medication 5000 UNITS: at 08:49

## 2018-01-01 RX ADMIN — PERFLUTREN 1 ML: 6.52 INJECTION, SUSPENSION INTRAVENOUS at 10:19

## 2018-01-01 ASSESSMENT — PULMONARY FUNCTION TESTS
PIF_VALUE: 0
PIF_VALUE: 1
PIF_VALUE: 0
PIF_VALUE: 1
PIF_VALUE: 0
PIF_VALUE: 1
PIF_VALUE: 0
PIF_VALUE: 1
PIF_VALUE: 0

## 2018-01-01 ASSESSMENT — PAIN SCALES - GENERAL
PAINLEVEL_OUTOF10: 0

## 2018-01-01 ASSESSMENT — ENCOUNTER SYMPTOMS
BLOOD IN STOOL: 0
DOUBLE VISION: 0
NAUSEA: 0
COUGH: 0
ABDOMINAL PAIN: 0
EYE DISCHARGE: 0
BACK PAIN: 0
SHORTNESS OF BREATH: 1
VISUAL CHANGE: 0
SHORTNESS OF BREATH: 0
ORTHOPNEA: 0
COLOR CHANGE: 0
HEMOPTYSIS: 0
DIARRHEA: 0
BOWEL INCONTINENCE: 0
NAUSEA: 0
VOMITING: 0
DIARRHEA: 0
BLOOD IN STOOL: 0
RHINORRHEA: 0
EYE PAIN: 0
BACK PAIN: 0
SPUTUM PRODUCTION: 1
ABDOMINAL PAIN: 0
CONSTIPATION: 0
PHOTOPHOBIA: 0
SORE THROAT: 0
CHEST TIGHTNESS: 0
EYE PAIN: 0
VOMITING: 0
HEARTBURN: 0

## 2018-02-05 PROBLEM — L40.50 PSORIATIC ARTHRITIS (HCC): Status: ACTIVE | Noted: 2018-01-01

## 2018-06-20 PROBLEM — R31.0 GROSS HEMATURIA: Status: ACTIVE | Noted: 2018-01-01

## 2018-06-26 PROBLEM — I95.9 HYPOTENSION: Status: ACTIVE | Noted: 2018-01-01

## 2018-06-26 PROBLEM — N17.9 ACUTE RENAL FAILURE (HCC): Status: ACTIVE | Noted: 2018-01-01

## 2018-06-27 NOTE — H&P
dry, upper extremity bruise, left lower extremity with improved  Head: normocephalic and atraumatic  Eyes: pupils equal, round, and reactive to light, extraocular eye movements intact, conjunctivae normal  Neck: neck supple and non tender without mass   Pulmonary/Chest: clear to auscultation bilaterally- no wheezes, rales or rhonchi, normal air movement, no respiratory distress  Cardiovascular: normal rate, normal S1 and S2 and no carotid bruits  Abdomen: soft, non-tender, non-distended, normal bowel sounds, no masses or organomegaly  Extremities: No joint deformities bilateral hands and feet, pes planus bilaterally, bilateral feet callus soft mild serous discharge no pus no erythema and +1 edema  Neurologic: no cranial nerve deficit and speech normal        LABS:  Recent Labs      18   1620   NA  140   K  4.7   CL  102   CO2  20*   BUN  85*   CREATININE  4.2*   GLUCOSE  122*   CALCIUM  7.3*       Recent Labs      18   1620   WBC  8.2   RBC  4.03   HGB  10.7*   HCT  36.0*   MCV  89.3   MCH  26.6   MCHC  29.7*   RDW  17.0*   PLT  291   MPV  10.1       No results for input(s): POCGLU in the last 72 hours. Radiology: Ct Abdomen Pelvis Wo Contrast Additional Contrast? None    Result Date: 2018  Patient MRN:  [id number] : 1935 Age: 81 years Gender: Male Order Date:  2018 4:15 PM TECHNIQUE/NUMBER OF IMAGES/COMPARISON/CLINICAL HISTORY: CT abdomen and pelvis Axial with sagittal and coronal reconstructions 421 images History is hematuria wound check in the right foot fall trauma injury. Had previous right-sided nephrectomy. Has a abdominal aortic aneurysm with intravascular repair/grafting. Had previous anterior abdominal wall hernia repair. Has bilateral hip prosthesis. Comparison is made with the study of 2016. FINDINGS: There are markedly abnormal findings in the maribell hepatis region with marked dilatation of the right and left intrahepatic biliary tree/radicles.  The common dimension, laterally as compared to medially, of indeterminate etiology and significance. 4. Moderate talonavicular osteoarthritis. Xr Foot Left (min 3 Views)    Result Date: 2018  Patient MRN:  [id number] : 1935 Age: 81 years Gender: Male Order Date:  2018 4:15 PM TECHNIQUE/NUMBER OF IMAGES/COMPARISON/CLINICAL HISTORY: X-ray series of the foot the left frontal lateral and oblique projections History osteomyelitis. 3 images, 3 views. FINDINGS: Generalized osteopenia is seen. There is diffuse mild soft tissue swelling in the distal left lower extremity through the ankle and throughout the dorsal aspect of the left foot. Degenerative changes are seen in the subtalar joints, can be part of neuropathic joint. The phalanxes are intact and as well as the interphalangeal joints. The metatarsal bones are intact and as well as the metatarsophalangeal joints. Tarsal metatarsal joints are preserved. The distal intertarsal joints are also preserved. Non aggressive destructive bone process seen to indicate active osteomyelitis. Non aggressive periosteal reaction is seen. No focal osteolysis is observed. 1. Degenerative changes predominantly seen in the subtalar joints can be related with neuropathic joint. 2. Cannot conspicuously identify an area of an aggressive destructive bone process to indicate active osteomyelitis. Xr Foot Right (min 3 Views)    Result Date: 2018  Patient MRN:  [id number] : 1935 Age: 81 years Gender: Male Order Date:  2018 4:15 PM TECHNIQUE/NUMBER OF IMAGES/COMPARISON/CLINICAL HISTORY: Foot right frontal lateral and oblique projections 3 images, 3 views Osteomyelitis FINDINGS: There is a flat foot configuration with loss of the calcaneal angle in the lateral projection. Degenerative changes are seen in the subtalar joints and in the tarsal joints can be on the lateral part bases.  Diffuse soft tissue swelling is seen in the distal right lower extremity into the left ankle and foot. Cannot see conspicuously an acute periosteal reaction or focal area of ostial lysis. There is an area of fibrosis in the region for the cuboid bone seen in the lateral view but difficult to be demonstrated in other projections. There is an area of lucency of the talus difficult to be seen in all views. 1. More likely neuropathic joint in the intertarsal joints. 2. can be additionally evaluated with other images modalities such as CT or better MRI study without and with IV contrast. 3. Diffuse soft tissue swelling in the distal right lower extremity. Ct Head Wo Contrast    Result Date: 2018  Patient MRN: [id number] : 1935 Age:  81 years Order Date: 2018 4:15 PM Gender: Male Exam: CT HEAD WO CONTRAST Number of Images: 150 Indication:   Inability to walk well at home, hematuria, fall last night Comparison: None. Findings: This examination was performed on a CT scanner with dose reduction. Technique: Low-dose CT  acquisition technique included one of following options; 1 . Automated exposure control, 2. Adjustment of MA and or KV according to patient's size or 3. Use of iterative reconstruction. Moderate cerebral volume loss/atrophy. Hypodensities in the bilateral centrum semiovale and periventricular regions. No intraparenchymal hemorrhage. No extra-axial fluid collection or hematoma. Vascular calcifications within the bilateral internal carotid artery cavernous segments. . No subfalcine, transtentorial or tonsillar herniation or shift. Visualized portions of the calvarium, bony orbital walls, bilateral zygomatic arches, bilateral nasal bones, bony paranasal sinuses, visualized mandible, visualized skull base, and visualized mastoid air cells demonstrate no definitive evidence of acute fracture or lytic or blastic bony lesion. Please see CT cervical spine report for details regarding the upper cervical spine is in an incompletely visualized on this study.      1. No CT evidence of acute intracranial abnormality, as questioned. If there is further clinical concern, MRI of the brain would be recommended for more detailed evaluation. . 2. Moderate cerebral volume loss/atrophy. 3. Moderate white matter changes consistent with sequelae of small vessel ischemic disease. 4. Vascular calcifications within the bilateral internal carotid artery cavernous segments. . 5. Please see CT cervical spine report given the cervical spine is incompletely imaged on this exam.    Xr Chest Portable    Result Date: 2018  Patient MRN: [id number] : 1935 Age:  81 years Gender: Male Order Date: 2018 4:15 PM Exam: XR CHEST PORTABLE Number of Views: 1 Indication:   Urinary tract infection. Fatigue. Fall. Wounds on bottom of both feet. Comparison: Chest radiograph 3/19/2011 Findings: There is a normal cardiomediastinal silhouette with central pulmonary vascular congestion. No acute fracture on this severely limited fracture evaluation. Moderately severe degenerative changes left glenohumeral joint. . No pneumothorax. 1. Central pulmonary vascular congestion suspected. 2. Moderately severe left glenohumeral osteoarthritis. 3. No definitive acute fracture on this severely limited fracture evaluation exam.      EKG: Poor baseline and able to assess the rhythm, but probably sinus, Q waves anteriorly V1 and V2    ASSESSMENT:      Principal Problem:    Hypotension  Active Problems:    Abdominal aortic aneurysm without rupture (HCC)    Essential hypertension    Kidney mass    Psoriatic arthritis (HCC)    Gross hematuria    Acute renal failure (HCC)  Resolved Problems:    * No resolved hospital problems. *      PLAN:    1. Hypotension, I am not sure about the etiology, could have been dehydration as the patient responded well to fluids, I will continue holding antihypertensive medication.   Despite the fact that the patient did not complain of any shortness of breath or chest pain, the fact that his blood inadvertent computerized transcription errors may be present.   Electronically signed by Kip Ellison MD on 6/27/2018 at 1:35 AM

## 2018-06-27 NOTE — PROGRESS NOTES
Physical Therapy    Facility/Department: [address] INTERMEDIATE 1  Initial Assessment    NAME: Rodger Montgomery  : 1935  MRN: [id number]    Date of Service: 2018    Patient Diagnosis(es): The primary encounter diagnosis was Acute renal failure, unspecified acute renal failure type ([facility] [address] A diagnosis of Obstructive jaundice due to malignant neoplasm [facility]) was also pertinent to this visit. has a past medical history of Abdominal aortic aneurysm without rupture ([facility] [address] Acute renal failure ([facility] [address] Cancer ([facility] [address] Gout; Hyperlipidemia; Hypertension; Osteoarthritis; and Psoriasis. has a past surgical history that includes Kidney removal (Right, 2010); Total hip arthroplasty; joint replacement (Bilateral, , ?); Colonoscopy; Abdomen surgery (); Tonsillectomy; and AAA repair, endovascular (2016). Evaluating Therapist: Scarlet Flannery PT     Room #: 956   DIAGNOSIS:  Hematuria, recent fall   PRECAUTIONS: falls, long standing B foot ulcers - wears orthotics     Social:  Pt lives with daughter and son in law  in a  2 floor plan with first floor set up 4 platform  steps  to enter. 1 step to living room in home   Prior to admission pt walked with standard walker      Initial Evaluation  Date:  18  Treatment      Short Term/ Long Term   Goals   Was pt agreeable to Eval/treatment?   yes      Does pt have pain?  no pain reported, feels weak     Bed Mobility  Rolling:  NT   Supine to sit:  Dep assist x 2   Sit to supine:  Dep assist  X 2   Scooting:  Dep assist x 2    mod /max assist x 2    Transfers Sit to stand:  Unable to achieve even minimal stand with dep assist x 2     Mod/max assist x 2    Ambulation   NT    5  feet with  ww  with  Mod assist x 1-2    LE ROM  AAROM WFL      LE strength  R LE 3- to 4-/ 5  L LE 3- to 3+/ 5   4- to 4/ 5    AM- PAC RAW score               Pt is alert and Oriented x   3    Balance: stand NT, SBA in sit   Endurance: poor      ASSESSMENT  Pt displays functional ability as noted in the objective portion of this evaluation. Comments/Treatment:  Pt reports he lost all of his strength yesterday . Attempted to stand , but unable to even get buttocks off off the bed with max/dep assist x 2        Examination of body systems Decreased   Functional mobility x   ROM    Strength x   Safety Awareness    Cognition    Endurance x   Sensation    Balance x   Vision/Visual Deficits    Coordination        Patient education  Pt educated on  Fall risk, importance of mobility     Patient response to education:   Pt verbalized understanding Pt demonstrated skill Pt requires further education in this area   x  x     Rehab potential is Good for reaching above PT goals. Pts/ family goals   1. Increase strength     Patient and or family understand(s) diagnosis, prognosis, and plan of care. -  Yes     PLAN  PT care will be provided in accordance with the objectives noted above. Whenever appropriate, clear delegation orders will be provided for nursing staff. Exercises and functional mobility practice will be used as well as appropriate assistive devices or modalities to obtain goals. Patient and family education will also be administered as needed. Frequency of treatments will be 2-3 x/week x  7 days.     Time in:  1612    Time out:  [address] [facility] number:  [id number]

## 2018-06-27 NOTE — PROGRESS NOTES
Occupational Therapy  OCCUPATIONAL THERAPY INITIAL EVALUATION      Date:2018  Patient Name: Carisa Hughes  MRN: [id number]  : 1935  Room: [address] [place] [address]     Evaluating OT: Jenny Girard OTR/L       Recommended Adaptive Equipment: TBA     AM-PAC Daily Activity Raw Score: 10/24    Diagnosis: Acute renal Failure. Presented to ED after fall 2 days  ago and progressive weakness  Surgery:    Past Medical History: B foot ulcers, gout, OA  Precautions: Falls,alarm      Home Living/PLOF:   Patient lives with daughter and son in law. 1 story, 4 platform steps to enter. 1 step in the house to living room. Walk in shower   PTA: Patient reports family assists with lower body ADLs, ambulates with standard walker,  W/c for longer distances     Pain Level: pt c/o no pain, feeling weak  this session     Hearing: WFLS  Vision: WFLS      Cognition: oriented x 3    UE Assessment:  Hand Dominance: Right []  Left []    UE AROM and strength WFLs - B hand AROM impaired - arthritic deformities       Functional Assessment:   Initial Eval Status 18  Comments   Feeding  Set-up     Grooming  Mod  A,seated     Upper Body Dressing Mod A      Lower Body Dressing Dependent     Bathing     Toileting      Bed Mobility  Supine to Sit: Dependent x2  Sit to Supine:Dependent x2    Functional Transfers Dependent x2  Sit-stand from bed  Unable to clear buttocks from bed      Functional Mobility NT      Sit balance: SBA, static sitting seated EOB   Stand balance: unable to stand   Endurance/Activity tolerance: fair   Sensation:                                 Comments: Upon arrival pt lying in bed . At end of session pt returned to bed  with all devices within reach, all lines and tubes intact. Call light within reach.   Bed/chair alarm on        Assessment of current deficits    Functional mobility [x]  ROM [] Strength [x]  Cognition []  ADLs [x]   IADLs [] Safety Awareness [x] Endurance [x]  Fine Motor Coordination [] Balance [x] Vision/perception [] Sensation []   Gross Motor Coordination []     Treatment frequency:2-5x/week     Plan of Care:   ADL retraining [x]   Equipment needs [x]   Neuromuscular re-education [] Energy Conservation Techniques []  Functional Transfer training [x] Patient and/or Family Education [x]  Functional Mobility training [x]  Environmental Modifications []  Cognitive re-training []   Compensatory techniques for ADLs []  Splinting Needs []                             Therapeutic Activities [x]  Positioning/joint protection []             Therapeutic Strengthening  [x]      Other: []      Rehab Potential: Good/    Patient / Family Goal: Return Home     Short term goals  Time Frame: 2 weeks     1. Func xfers Mod A x2  2. Func mobility Mod A x2 with good tolerance  3. Perform UE Dressing Min A  4. Perform grooming task at sitting level Min A  5. Tolerate 4 min func/therapeutic standing task Mod A x2 with good tolerance     Eval Complexity: Low     Patient and/or family educated on safety awareness  .  Patient and/or family understands OT  plan of care: NO family present     Time in: 1612  Time out: [address] OTR/L [id number]

## 2018-06-27 NOTE — CONSULTS
daily  atenolol (TENORMIN) 25 MG tablet, Take 1 tablet by mouth 2 times daily  finasteride (PROSCAR) 5 MG tablet, Take 1 tablet by mouth daily Takes in the evening  hydrALAZINE (APRESOLINE) 10 MG tablet, Take 1 tablet by mouth daily  pravastatin (PRAVACHOL) 40 MG tablet, Take 1 tablet by mouth daily  cycloSPORINE modified (NEORAL) 100 MG capsule, Take 1 capsule by mouth daily Takes for arthritis and psoriasis  torsemide (DEMADEX) 20 MG tablet, Take 1 tablet by mouth every other day    Allergies:    Latex; Codeine; Sulfa antibiotics; and Tape [adhesive tape]    Social History:    reports that he quit smoking about 37 years ago. He has a 30.00 pack-year smoking history. He has never used smokeless tobacco. He reports that he drinks alcohol. He reports that he does not use drugs. Family History:   Non-contributory to this Urological problem  family history includes Heart Disease in his father; High Blood Pressure in his mother. Review of Systems:  Respiratory: negative for cough and hemoptysis  Cardiovascular: negative for chest pain and dyspnea  Gastrointestinal: negative for abdominal pain, diarrhea, nausea and vomiting  Derm: ulcers to feet  Neurological: negative for seizures and tremors, extreme weakness  Endocrine: negative for diabetic symptoms including polydipsia and polyuria  : As above in the HPI, otherwise negative  All other reviews are negative    Physical Exam:     Vitals:  /65   Pulse 66   Temp 97.9 °F (36.6 °C) (Oral)   Resp 16   Ht 6' (1.829 m)   Wt 243 lb 6.4 oz (110.4 kg)   SpO2 96%   BMI 33.01 kg/m²     General:  Awake, alert, oriented X 3. Well developed, well nourished, obese. HEENT:  Normocephalic, atraumatic. Pupils equal, round. No scleral icterus. No conjunctival injection. Normal lips, teeth, and gums. No nasal discharge. Neck:  Supple, no masses. Heart:  RRR  Lungs:  No audible wheezing. Respirations symmetric and non-labored.   Abdomen:  soft, nontender, scar

## 2018-06-27 NOTE — PROGRESS NOTES
Dr. Pascual [facility] Notified of consult via perfect serve waiting for conformation Celestia Needs 6/27/2018 1451pm  Dr. Patience Ayers notification of consult via perfect serve at [address] [facility]First [facility]  6/27/2018 3:11 PM

## 2018-06-27 NOTE — CONSULTS
GENERAL SURGERY  CONSULT NOTE  6/27/2018    Physician Consulted: Dr. Amish Velazquez  Reason for Consult: GB/Liver malignancy  Referring Physician: Dr. Dalton Rhodes    HPI  Rodger Montgomery is a 81 y.o. 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 He presented to ED after fall 2 d ago and progressive weakness. He denies abdominal pain. He does report weight loss of 20lbs over 6 mos. Denies nv. He has had some decreased appetite. Remote history of EGD/colonoscopy. History of R nephrectomy for RCC - reports CA present on L being monitored conservatively. No reported fevers, chills, CP, SOB. Hx EVAR    Past Medical History:   Diagnosis Date    Abdominal aortic aneurysm without rupture ([facility] 1/18/2016    Acute renal failure ([facility] 6/26/2018    Cancer ([facility]     kidney    Gout     Hyperlipidemia     Hypertension     Osteoarthritis     Psoriasis        Past Surgical History:   Procedure Laterality Date    ABDOMEN SURGERY  2015    mesh inserted    ABDOMINAL AORTIC ANEURYSM REPAIR, ENDOVASCULAR  01/25/2016    Excluder, Delatore    COLONOSCOPY      JOINT REPLACEMENT Bilateral 1980, 1981?    hips    KIDNEY REMOVAL Right 02/2010    TONSILLECTOMY      TOTAL HIP ARTHROPLASTY         Medications Prior to Admission:    Prior to Admission medications    Medication Sig Start Date End Date Taking?  Authorizing Provider   nitrofurantoin (MACRODANTIN) 100 MG capsule Take 100 mg by mouth 2 times daily   Yes Historical Provider, MD   allopurinol (ZYLOPRIM) 300 MG tablet Take 1 tablet by mouth daily 5/17/18  Yes Mert Branham PA-C   atenolol (TENORMIN) 25 MG tablet Take 1 tablet by mouth 2 times daily 5/17/18  Yes Mert Branham PA-C   finasteride (PROSCAR) 5 MG tablet Take 1 tablet by mouth daily Takes in the evening 5/17/18  Yes Mert Branham PA-C   hydrALAZINE (APRESOLINE) 10 MG tablet Take 1 tablet by mouth daily 5/17/18  Yes Mert Branham PA-C   pravastatin (PRAVACHOL) 40 MG tablet Take 1 tablet by mouth daily 18  Yes Estevan Libman, PA-C   cycloSPORINE modified (NEORAL) 100 MG capsule Take 1 capsule by mouth daily Takes for arthritis and psoriasis 10/27/17  Yes Estevan Libman, PA-C   torsemide (DEMADEX) 20 MG tablet Take 1 tablet by mouth every other day 10/27/17  Yes Estevan Libman, PA-C       Allergies   Allergen Reactions    Latex     Codeine     Sulfa Antibiotics     Tape Madalynn Miners Tape]        Family History   Problem Relation Age of Onset    High Blood Pressure Mother     Heart Disease Father        Social History   Substance Use Topics    Smoking status: Former Smoker     Packs/day: 1.50     Years: 20.00     Quit date: 12/15/1980    Smokeless tobacco: Never Used    Alcohol use Yes      Comment: very rare at best         Review of Systems as above      PHYSICAL EXAM:    Vitals:    18 0200   BP: (!) 113/59   Pulse: 67   Resp: 16   Temp: 97.8 °F (36.6 °C)   SpO2: 96%       General Appearance:  awake, alert, oriented, in no acute distress  Skin:  Scattered ecchymosis. Head/face:  NCAT  Eyes:  No gross abnormalities. Lungs:  Normal expansion. No increased work of breathing  Heart:  Heart regular rate  Abdomen:  Soft, NT, ND  Extremities: Extremities warm to touch, pink, with trace edema. LABS:    CBC  Recent Labs      18   1620   WBC  8.2   HGB  10.7*   HCT  36.0*   PLT  291     BMP  Recent Labs      18   1620   NA  140   K  4.7   CL  102   CO2  20*   BUN  85*   CREATININE  4.2*   CALCIUM  7.3*     Liver Function  Recent Labs      18   1620   LIPASE  66*   BILITOT  1.7*   AST  104*   ALT  61*   ALKPHOS  767*   PROT  5.9*   LABALBU  3.1*     No results for input(s): LACTATE in the last 72 hours. No results for input(s): INR, PTT in the last 72 hours.     Invalid input(s): PT    RADIOLOGY    Ct Abdomen Pelvis Wo Contrast Additional Contrast? None    Result Date: 2018  Patient MRN:  [id number] : 1935 Age: 81 years Gender: Male Order Date:  2018 4:15 PM TECHNIQUE/NUMBER OF IMAGES/COMPARISON/CLINICAL HISTORY: CT abdomen and pelvis Axial with sagittal and coronal reconstructions 421 images History is hematuria wound check in the right foot fall trauma injury. Had previous right-sided nephrectomy. Has a abdominal aortic aneurysm with intravascular repair/grafting. Had previous anterior abdominal wall hernia repair. Has bilateral hip prosthesis. Comparison is made with the study of February 23, 2016. FINDINGS: There are markedly abnormal findings in the maribell hepatis region with marked dilatation of the right and left intrahepatic biliary tree/radicles. The common bile duct is not dilated, measures 2 mm in the head of the pancreas. There is also marked abnormal appearance of the gallbladder which appears to be distended with ill-defined walls and ill-defined interface between the gallbladder and the liver parenchyma in the region of the gallbladder fossa. As observed on previous study multiple gallstones are present. The findings can be manifestation of a primary malignant infiltrative of the biliary tree at the level of the high maribell hepatis causing occlusion of the common hepatic duct at the confluence between the right and the left main biliary radicals and of the cystic duct versus primary malignancy of the gallbladder infiltrate in the high maribell hepatis region and causing obstructive jaundice. If additional information will be needed in clinical management prior to an interventional procedure such as ERCP, MRI study multiphase contrast can be in better advantage with MRCP. 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 The aneurysm measures approximately 5.3 x 6.7 cm in the transverse diameter, previously it measured 5.2 x 6.3 cm. The aneurysm appears to be slightly larger since the previous examination.  This aneurysm is surrounding the proximal segments of the right and left iliac or hematoma. Vascular calcifications within the bilateral internal carotid artery cavernous segments. . No subfalcine, transtentorial or tonsillar herniation or shift. Visualized portions of the calvarium, bony orbital walls, bilateral zygomatic arches, bilateral nasal bones, bony paranasal sinuses, visualized mandible, visualized skull base, and visualized mastoid air cells demonstrate no definitive evidence of acute fracture or lytic or blastic bony lesion. Please see CT cervical spine report for details regarding the upper cervical spine is in an incompletely visualized on this study. 1. No CT evidence of acute intracranial abnormality, as questioned. If there is further clinical concern, MRI of the brain would be recommended for more detailed evaluation. . 2. Moderate cerebral volume loss/atrophy. 3. Moderate white matter changes consistent with sequelae of small vessel ischemic disease. 4. Vascular calcifications within the bilateral internal carotid artery cavernous segments. . 5. Please see CT cervical spine report given the cervical spine is incompletely imaged on this exam.    Xr Chest Portable    Result Date: 2018  Patient MRN: [id number] : 1935 Age:  81 years Gender: Male Order Date: 2018 4:15 PM Exam: XR CHEST PORTABLE Number of Views: 1 Indication:   Urinary tract infection. Fatigue. Fall. Wounds on bottom of both feet. Comparison: Chest radiograph 3/19/2011 Findings: There is a normal cardiomediastinal silhouette with central pulmonary vascular congestion. No acute fracture on this severely limited fracture evaluation. Moderately severe degenerative changes left glenohumeral joint. . No pneumothorax. 1. Central pulmonary vascular congestion suspected. 2. Moderately severe left glenohumeral osteoarthritis.  3. No definitive acute fracture on this severely limited fracture evaluation exam.        ASSESSMENT:  81 y.o. male with findings concerning for hepatobiliary malignancy v metastatic disease process    PLAN:    Tumor markers  US abdomen w doppler  Monitor LFTs  Nephro cx - eligibility for contrast given single kidney and INDY  Will need further imaging - MRI/MRCP v CT triphasic - though renal function poor as above and patient reports bilateral hip prosthesis >30 yrs old which raises to question MR candidacy  Will follow    Kofi Fernandez DO  6/27/18  10:45 AM    Patient was seen and examined  Chart reviewed  Agree with the assessment and plan

## 2018-06-27 NOTE — PROGRESS NOTES
[facility] Hospitalist Progress Note    Subjective:    Patient resting in bed in no acute distress  Denies pain  But c/o feeling hungry. She was NPO for ultrasound  Currently waiting on surgery- per nursing message sent to see if patient could eat or if further testing planned  Denies fevers, chills, N/V      RoS: No fever, chills, headache, lightheadedness.  No other new ENT, RS, CVS, GI or  sx.     cycloSPORINE modified  100 mg Oral Nightly    allopurinol  300 mg Oral Daily    finasteride  5 mg Oral Daily    nitrofurantoin  100 mg Oral BID    pravastatin  40 mg Oral Daily    sodium chloride flush  10 mL Intravenous 2 times per day       sodium chloride flush 10 mL PRN   sodium chloride flush 10 mL PRN   magnesium hydroxide 30 mL Daily PRN   ondansetron 4 mg Q6H PRN        Objective:    /65   Pulse 66   Temp 97.9 °F (36.6 °C) (Oral)   Resp 16   Ht 6' (1.829 m)   Wt 243 lb 6.4 oz (110.4 kg)   SpO2 96%   BMI 33.01 kg/m²   General Appearance: alert and oriented to person, place and time  Skin: warm and dry, upper extremity bruise, left lower extremity with improved  Head: normocephalic and atraumatic  Neck: neck supple and non tender without mass   Pulmonary/Chest: clear to auscultation bilaterally-   Cardiovascular: normal rate, normal S1 and S2 and no carotid bruits  Abdomen: soft, non-tender, non-distended, normal bowel sounds, no masses or organomegaly  Extremities: No joint deformities bilateral hands and feet, pes planus bilaterally, bilateral feet callus soft mild serous discharge no pus no erythema and +1 edema  Neurologic: speech normal   Dunn: draining, clear, yellow    Recent Labs      06/26/18   1620  06/27/18   0540   NA  140  139   K  4.7  4.4   CL  102  106   CO2  20*  17*   BUN  85*  76*   CREATININE  4.2*  3.7*   GLUCOSE  122*  149*   CALCIUM  7.3*  6.5*       Recent Labs      06/26/18   1620  06/27/18   0540   WBC  8.2  7.9   RBC  4.03  3.55*   HGB  10.7*  9.6*   HCT  36.0* hyperdensities in left kidney with questionable metastasis in the omental fat. 7.  Code status patient is DNR CCA, he does not want any intubation unless a surgical procedure is planned  8. Elevated troponin, might be type II MI due to hypertension and acute renal failure,troponin trending down. Hold off on cardiology consult. 9.  Bilateral feet calluses and ulcers: podiatry consulted. Hold off on antibiotics for now. 10. Weakness: PT/OT       Dispo: PT/OT    Electronically signed by LYLY Clayton on 6/27/2018 at 2:39 PM   HOSPITALIST ATTENDING PHYSICIAN NOTE 6/27/2018 1608PM:    Details of the evaluation - subjective assessment (including medication profile, past medical, family and social history when applicable), examination, review of lab and test data, diagnostic impressions and medical decision making - performed by LYLY Clayton, were discussed with me on the date of service and I agree with clinical information herein unless otherwise noted. The patient has been evaluated by me personally earlier today. Pt reports no fevers, chills,n/v. Pt c/o being hungry        PHYSICAL EXAM:    Vitals:  BP (!) 141/80   Pulse 66   Temp 97.6 °F (36.4 °C) (Oral)   Resp 18   Ht 6' (1.829 m)   Wt 243 lb 6.4 oz (110.4 kg)   SpO2 96%   BMI 33.01 kg/m²     General:  Appears comfortable. Answers questions appropriately and cooperative with exam  HEENT:  Mucous membranes moist. No erythema, rhinorrhea, or post-nasal drip noted. Neck:  No carotid bruits. Heart:  Rhythm regular at rate of 68  Lungs:  CTA. No wheeze, rales, or rhonchi  Abdomen:  Positive bowel sounds positive. Soft. Non-tender. No guarding, rebound or rigidity. Breast/Rectal/Genitourinary: not pertinent. Extremities:  positive for 1+ b/l lower extremity edema  Skin:  Warm and dry  Vascular: 2/4 Dorsalis Pedis pulses bilaterally. Neuro:  Cranial nerves 2-12 grossly intact, no focal weakness or change in sensation noted. Extraocular muscles intact. Pupils equal, round, reactive to light. I agree with the assessment and plan of Jamila Bumps, APN. Hypotension\  Dehydration  ambrocio  Hematuria  htn  Abnormal ct  H/o kidney ca s/p right nephrectomy   Elevated troponin     Chart reviewed and updated by nursing    Time spent is 35 min    Electronically signed by Sarahi Stoner D.O.   Hospitalist  [facility] at [facility]

## 2018-06-27 NOTE — CONSULTS
Full Consult     HPI:  Patient seen in evaluation for elevated cardiac troponin, hypotension and abnormal EKG. Patient admitted with weakness and dizziness. Had hypotension that responded to volume administration. EKG suggested accelerated junctional rhythm. Labs noted to have elevated troponin and BNP level. Patient denies any chest pain or shortness of breath. Past Medical History:   Diagnosis Date    Abdominal aortic aneurysm without rupture ([facility] 1/18/2016    [facility] 6/26/2018    Cancer ([facility]     kidney    Gout     Hyperlipidemia     Hypertension     Osteoarthritis     Psoriasis         Social History     Social History    Marital status:      Spouse name: N/A    Number of children: N/A    Years of education: N/A     Occupational History    Not on file.      Social History Main Topics    Smoking status: Former Smoker     Packs/day: 1.50     Years: 20.00     Quit date: 12/15/1980    Smokeless tobacco: Never Used    Alcohol use Yes      Comment: very rare at best    Drug use: No    Sexual activity: No     Other Topics Concern    Not on file     Social History Narrative    No narrative on file        Family History   Problem Relation Age of Onset    High Blood Pressure Mother     Heart Disease Father        ROS:   General: No unusual weight gain, change in exercise tolerance  Skin: No rash or itching  EENT: No vision changes or nosebleeds  Cardiovascular: No orthopnea or paroxysmal nocturnal dyspnea  Respiratory: No cough or hemoptysis  Gastrointestinal: No hematemesis or recent changes in bowel habits  Genitourinary: No hematuria, urgency or frequency  Musculoskeletal: No muscular weakness or joint swelling   Neurologic / Psychiatric: No incoordination or convulsions  Allergic / Immunologic/ Lymphatic / Endocrine: No anemia or bleeding tendency    Physical Exam:   Vitals:    06/27/18 0200 06/27/18 0730 06/27/18 1245 06/27/18 1530   BP: (!) 113/59 102/65 137/72 (!)

## 2018-06-27 NOTE — CONSULTS
2 times per day  sodium chloride flush 0.9 % injection 10 mL, 10 mL, Intravenous, PRN  magnesium hydroxide (MILK OF MAGNESIA) 400 MG/5ML suspension 30 mL, 30 mL, Oral, Daily PRN  ondansetron (ZOFRAN) injection 4 mg, 4 mg, Intravenous, Q6H PRN    Allergies:  Latex; Codeine; Sulfa antibiotics; and Tape [adhesive tape]    Social History:    Tobacco former smoker quit in 1980, one pack per day for 20 years  Alcohol: Once yearly  Illicit: Denies use  Activity: Patient currently lives at home see intense support of his son and daughter-in-law, he is dependent on a walker for ambulation has difficulty navigating stairs. Family History: Noncontributory due to patient's advanced age        REVIEW OF SYSTEMS:     · Constitutional: Denies fevers, chills, night sweats, and positive for fatigue  · HEENT: Denies headaches, nose bleeds, and blurred vision,oral pain, abscess or lesion. · Musculoskeletal: Denies edema to BLE. Positive for mechanical falls, one day prior to admission the patient became weak and fell no loss of consciousness, positive for bilateral lower extremity edema  · Neurological: Denies dizziness and lightheadedness, numbness and tingling  · Cardiovascular: Denies chest pain, palpitations, and feelings of heart racing. · Respiratory: Denies orthopnea and PND  · Gastrointestinal: Denies heartburn, nausea/vomiting, diarrhea and constipation, black/bloody, and tarry stools. Positive for anorexia  · Genitourinary: Denies dysuria and positive for hematuria  · Hematologic: Denies excessive bruising or bleeding  · Lymphatic: Denies lumps and bumps to neck, axilla, breast, and groin  · Endocrine: Denies excessive thirst. Denies intolerance to hot and cold  · Psychiatric: Denies anxiety and depression.     PHYSICAL EXAM:   BP (!) 141/80   Pulse 66   Temp 97.6 °F (36.4 °C) (Oral)   Resp 18   Ht 6' (1.829 m)   Wt 243 lb 6.4 oz (110.4 kg)   SpO2 96%   BMI 33.01 kg/m²   CONST:  Well developed  male, widening of the ankle mortise in the craniocaudal dimension,   laterally as compared to medially, of indeterminate etiology and   significance. 4. Moderate talonavicular osteoarthritis. XR PELVIS (1-2 VIEWS)   Final Result      XR ANKLE LEFT (2 VIEWS)   Final Result   ALERT:  THIS IS AN ABNORMAL REPORT   1. Severe soft tissue swelling. 2. Suspected large joint effusion. 3. Abnormal soft tissue density posterior to the tibiotalar joint and   superior to the calcaneus of indeterminate etiology and significance. Correlation with CT scan recommended. 4. Mild tibiotalar osteoarthritis. US DUP ABD PEL RETRO SCROT COMPLETE    (Results Pending)   US ABDOMEN COMPLETE    (Results Pending)           Intake/Output Summary (Last 24 hours) at 06/27/18 1540  Last data filed at 06/27/18 0604   Gross per 24 hour   Intake              540 ml   Output             1000 ml   Net             -460 ml       Labs:   CBC:   Recent Labs      06/26/18   1620  06/27/18   0540   WBC  8.2  7.9   HGB  10.7*  9.6*   HCT  36.0*  31.6*   PLT  291  250     BMP: Recent Labs      06/26/18   1620  06/27/18   0540   NA  140  139   K  4.7  4.4   CO2  20*  17*   BUN  85*  76*   CREATININE  4.2*  3.7*   LABGLOM  14  16   CALCIUM  7.3*  6.5*     TSH:   Recent Labs      06/26/18   1620   TSH  2.300     CARDIAC ENZYMES:  Recent Labs      06/26/18   1620  06/27/18   0105   TROPONINI  0.10*  0.05*     FASTING LIPID PANEL:  Lab Results   Component Value Date    CHOL 171 10/27/2017    HDL 40 10/27/2017    LDLCALC 103 10/27/2017    TRIG 141 10/27/2017     LIVER PROFILE:  Recent Labs      06/26/18   1620   AST  104*   ALT  61*   LABALBU  3.1*     Impression plan per Dr. Douglas Mukherjee  Electronically signed by EBONY Roque CNP on 6/27/2018 at 3:40 PM   Full Consult      HPI:  Patient seen in evaluation for elevated cardiac troponin, hypotension and abnormal EKG. Patient admitted with weakness and dizziness.  Had hypotension that responded to volume administration. EKG suggested accelerated junctional rhythm. Labs noted to have elevated troponin and BNP level. Patient denies any chest pain or shortness of breath.     Past Medical History        Past Medical History:   Diagnosis Date    [facility]    [facility] 6/26/2018    Cancer (HCC)       kidney    Gout      Hyperlipidemia      Hypertension      Osteoarthritis      Psoriasis              Social History   Social History            Social History    Marital status:         Spouse name: N/A    Number of children: N/A    Years of education: N/A          Occupational History    Not on file.             Social History Main Topics    Smoking status: Former Smoker       Packs/day: 1.50       Years: 20.00       Quit date: 12/15/1980    Smokeless tobacco: Never Used    Alcohol use Yes         Comment: very rare at best    Drug use: No    Sexual activity: No           Other Topics Concern    Not on file          Social History Narrative    No narrative on file            Family History         Family History   Problem Relation Age of Onset    High Blood Pressure Mother      Heart Disease Father              ROS:   General: No unusual weight gain, change in exercise tolerance  Skin: No rash or itching  EENT: No vision changes or nosebleeds  Cardiovascular: No orthopnea or paroxysmal nocturnal dyspnea  Respiratory: No cough or hemoptysis  Gastrointestinal: No hematemesis or recent changes in bowel habits  Genitourinary: No hematuria, urgency or frequency  Musculoskeletal: No muscular weakness or joint swelling   Neurologic / Psychiatric: No incoordination or convulsions  Allergic / Immunologic/ Lymphatic / Endocrine: No anemia or bleeding tendency     Physical Exam:   Vitals   Vitals:     06/27/18 0200 06/27/18 0730 06/27/18 1245 06/27/18 1530   BP: (!) 113/59 102/65 137/72 (!) 141/80   Pulse: 67 66 66 66   Resp: 16 16 18 18 Temp: 97.8 °F (36.6 °C) 97.9 °F (36.6 °C) 98.1 °F (36.7 °C) 97.6 °F (36.4 °C)   TempSrc: Oral Oral Oral Oral   SpO2: 96% 96%   96%   Weight:           Height:                 HEAD & FACE: Normocephalic. Symmetric. EYES: No xanthelasma. Conjunctivae not injected. EARS, NOSE, MOUTH & THROAT: Good dentition. No oral pallor or cyanosis. NECK: No JVD at 30 degrees. No thyromegaly. RESPIRATORY: Clear to auscultation and percussion in all fields. No use of accessory muscle or intercostal retractions. CARDIOVASCULAR: Regular rate and rhythm. No lifts or thrills on palpitation. Auscultation with normal S1-S2 in intensity and splitting. No carotid bruits. Abdominal aorta not enlarged. Femoral arteries without bruits. Pedal pulses 1+. 1+ bilateral ankle edema edema. ABDOMEN: Soft without hepatic or splenic enlargement. No tenderness. MUSCULOSKELETAL: No kyphosis or scoliosis of the back. Good muscle strength and tone. No muscle atrophy. EXTREMITIES: No clubbing or cyanosis. SKIN: No Xanthomas or ulcerations. NEUROLOGIC: Oriented to time, place and person. Normal mood and affect. LYMPHATIC:  No palpable neck or supraclavicular nodes. No spleno           EKG: No acute changes     Impression:  1. Atypical cardiac enzyme pattern- doubt MI   2. Abnormal EKG rhythm -suggested NSR   3. Elevated BNP maybe secondary to renal status         Plan:  1. Check CPKMP  2. Check Echo            Jose Manuel Cunha  The above documentation has been prepared under my direction and personally reviewed by me in its entirety. I discussed patient case in detail with POLINA and reviewed POLINA's documentation including HPI, ROS, medical/surgical history, family history, social history, allergies, and medications. I confirm that HPI, Physical Exam, Assessment and Plan are performed in their entirety by me.    Jose Manuel Cunha MD

## 2018-06-27 NOTE — PROGRESS NOTES
Nephrology Consult Note  Patient's Name: Narda Roberto  1:31 PM  6/27/2018    Nephrologist: Marlin Mcclain    Reason for Consult:  Stage II INDY  Requesting Physician:  Dr. Lyle Angulo    Chief Complaint:  Weakness    History Obtained From:  patient and past medical records    History of Present Ilness:    Narda Roberto is a 81 y.o. male with prior history of CKD G3B with a baseline serum cr 1.9-2.1mg/dl with an e-GFR+30-34ml/min in the setting of being S/P R Nephrectomy for Renal cell carcinoma, papillary type, Furhman's nuclear grade 2 in Feb 2011. At that time he was also told he had lesions in the L kidney which have been followed conservatively. He notes over the last few weeks he had increasing weakness. He has fallen twice recently the most recent the day PTA. He is currently being treated for a R foot infection and he went to the podiatrist on the day of admission and was too weak to walk with his walker and came to the ED. In the ED the BP down to 67/41. He denied F/C/N/V/D. He is on lisinopril as an outpt. He notes his appetite is less than it use to be and he states the last time he was at [facility] the wt was 262# and when he came into the hospital 6/26 it was 243. CT Scan in the ED showed a mass lesion at the maribell hepatis with dilitation of the bile ducts and ascites.  He was in the ED 6/22 for hematuria and told he had a UTI and was placed on Macrobid    Past Medical History:   Diagnosis Date    Abdominal aortic aneurysm without rupture ([address] 1/18/2016    Acute renal failure ([address] 6/26/2018    Cancer ([address]     kidney    Gout     Hyperlipidemia     Hypertension     Osteoarthritis     Psoriasis        Past Surgical History:   Procedure Laterality Date    ABDOMEN SURGERY  2015    mesh inserted    ABDOMINAL AORTIC ANEURYSM REPAIR, ENDOVASCULAR  01/25/2016    Excluder, Ananth    COLONOSCOPY      JOINT REPLACEMENT Bilateral 1980, 1981?    hips    KIDNEY REMOVAL Right 02/2010  TONSILLECTOMY      TOTAL HIP ARTHROPLASTY         Family History   Problem Relation Age of Onset    High Blood Pressure Mother     Heart Disease Father         reports that he quit smoking about 37 years ago. He has a 30.00 pack-year smoking history. He has never used smokeless tobacco. He reports that he drinks alcohol. He reports that he does not use drugs. Allergies:  Latex; Codeine; Sulfa antibiotics; and Tape [adhesive tape]    Current Medications:      cycloSPORINE modified (NEORAL) capsule 100 mg Nightly   0.9 % sodium chloride infusion Continuous   sodium chloride flush 0.9 % injection 10 mL PRN   allopurinol (ZYLOPRIM) tablet 300 mg Daily   finasteride (PROSCAR) tablet 5 mg Daily   nitrofurantoin (MACRODANTIN) capsule 100 mg BID   pravastatin (PRAVACHOL) tablet 40 mg Daily   sodium chloride flush 0.9 % injection 10 mL 2 times per day   sodium chloride flush 0.9 % injection 10 mL PRN   magnesium hydroxide (MILK OF MAGNESIA) 400 MG/5ML suspension 30 mL Daily PRN   ondansetron (ZOFRAN) injection 4 mg Q6H PRN       Review of Systems:   Pertinent items are noted in HPI. Remainder of a complete review of systems is (-) other than stated above    Physical exam:   Constitutional:  Elderly male in NAD  Vitals:   VITALS:  /65   Pulse 66   Temp 97.9 °F (36.6 °C) (Oral)   Resp 16   Ht 6' (1.829 m)   Wt 243 lb 6.4 oz (110.4 kg)   SpO2 96%   BMI 33.01 kg/m²   24HR INTAKE/OUTPUT:    Intake/Output Summary (Last 24 hours) at 06/27/18 1332  Last data filed at 06/27/18 0604   Gross per 24 hour   Intake              540 ml   Output             1000 ml   Net             -460 ml     URINARY CATHETER OUTPUT (Dunn):  Urethral Catheter-Output (mL): 1000 mL  DRAIN/TUBE OUTPUT:     VENT SETTINGS:     Additional Respiratory  Assessments  Pulse: 66  Resp: 16  SpO2: 96 %    Skin: no rash, turgor wnl  Heent:  eomi, mmm, nc,at  Neck: no bruits or jvd noted, no thyromegaly  Cardiovascular: PMI lat displaced S1, S2 without S3 or rub  Respiratory: CTA B without w/r/r  Abdomen:  +bs, soft, nt, nd, no HSM  Ext: trace lower extremity edema bilat, RLE foot bandaged, ecchymosis on the LLE  Psychiatric: mood and affect appropriate, cr nr 2-12 grossly intact      Data:   Labs:  CBC:   Lab Results   Component Value Date    WBC 7.9 06/27/2018    RBC 3.55 06/27/2018    HGB 9.6 06/27/2018    HCT 31.6 06/27/2018    MCV 89.0 06/27/2018    MCH 27.0 06/27/2018    MCHC 30.4 06/27/2018    RDW 17.2 06/27/2018     06/27/2018    MPV 10.0 06/27/2018     CBC with Differential:    Lab Results   Component Value Date    WBC 7.9 06/27/2018    RBC 3.55 06/27/2018    HGB 9.6 06/27/2018    HCT 31.6 06/27/2018     06/27/2018    MCV 89.0 06/27/2018    MCH 27.0 06/27/2018    MCHC 30.4 06/27/2018    RDW 17.2 06/27/2018    SEGSPCT 67 03/01/2011    METASPCT 1 02/25/2011    LYMPHOPCT 11.4 06/26/2018    MONOPCT 8.3 06/26/2018    BASOPCT 0.7 06/26/2018    MONOSABS 0.68 06/26/2018    LYMPHSABS 0.93 06/26/2018    EOSABS 0.04 06/26/2018    BASOSABS 0.06 06/26/2018     Hemoglobin/Hematocrit:    Lab Results   Component Value Date    HGB 9.6 06/27/2018    HCT 31.6 06/27/2018     CMP:    Lab Results   Component Value Date     06/27/2018    K 4.4 06/27/2018     06/27/2018    CO2 17 06/27/2018    BUN 76 06/27/2018    CREATININE 3.7 06/27/2018    GFRAA 19 06/27/2018    LABGLOM 16 06/27/2018    GLUCOSE 149 06/27/2018    GLUCOSE 106 03/19/2011    PROT 5.9 06/26/2018    LABALBU 3.1 06/26/2018    LABALBU 2.6 03/17/2011    CALCIUM 6.5 06/27/2018    BILITOT 1.7 06/26/2018    ALKPHOS 767 06/26/2018     06/26/2018    ALT 61 06/26/2018     BMP:    Lab Results   Component Value Date     06/27/2018    K 4.4 06/27/2018     06/27/2018    CO2 17 06/27/2018    BUN 76 06/27/2018    LABALBU 3.1 06/26/2018    LABALBU 2.6 03/17/2011    CREATININE 3.7 06/27/2018    CALCIUM 6.5 06/27/2018    GFRAA 19 06/27/2018    LABGLOM 16 06/27/2018    GLUCOSE 149 06/27/2018    GLUCOSE 106 03/19/2011     Hepatic Function Panel:    Lab Results   Component Value Date    ALKPHOS 767 06/26/2018    ALT 61 06/26/2018     06/26/2018    PROT 5.9 06/26/2018    BILITOT 1.7 06/26/2018    LABALBU 3.1 06/26/2018    LABALBU 2.6 03/17/2011     Albumin:    Lab Results   Component Value Date    LABALBU 3.1 06/26/2018    LABALBU 2.6 03/17/2011     Calcium:    Lab Results   Component Value Date    CALCIUM 6.5 06/27/2018     Ionized Calcium:    Lab Results   Component Value Date    IONCA 1.28 02/14/2011     Magnesium:    Lab Results   Component Value Date    MG 2.1 04/05/2018     Phosphorus:    Lab Results   Component Value Date    PHOS 4.0 03/17/2011     LDH:  No results found for: LDH  Uric Acid:    Lab Results   Component Value Date    LABURIC 5.2 04/05/2018     PT/INR:    Lab Results   Component Value Date    PROTIME 12.9 06/20/2018    PROTIME 18.0 03/19/2011    INR 1.1 06/20/2018     PTT:    Lab Results   Component Value Date    APTT 25.0 06/20/2018   [APTT}  Troponin:    Lab Results   Component Value Date    TROPONINI 0.05 06/27/2018     U/A:    Lab Results   Component Value Date    NITRITE neg 07/20/2016    COLORU KATHERINE 06/26/2018    PROTEINU 30 06/26/2018    PHUR 5.0 06/26/2018    LABCAST MANY 06/26/2018    WBCUA 1-3 06/26/2018    WBCUA 5-10 03/16/2011    RBCUA 2-5 06/26/2018    RBCUA 0-1 03/16/2011    MUCUS Present 06/26/2018    BACTERIA RARE 06/26/2018    CLARITYU SL CLOUDY 06/26/2018    SPECGRAV 1.020 06/26/2018    LEUKOCYTESUR Negative 06/26/2018    UROBILINOGEN 1.0 06/26/2018    BILIRUBINUR MODERATE 06/26/2018    BILIRUBINUR neg 07/20/2016    BILIRUBINUR NEGATIVE 03/16/2011    BLOODU TRACE-INTACT 06/26/2018    GLUCOSEU Negative 06/26/2018    GLUCOSEU NEGATIVE 03/16/2011    AMORPHOUS RARE 06/26/2018     ABG:  No results found for: PH, PCO2, PO2, HCO3, BE, THGB, TCO2, O2SAT  HgBA1c:  No results found for: LABA1C  Microalbumen/Creatinine ratio:  No components found for: RUCREAT  FLP:    Lab Results   Component Value Date    TRIG 141 10/27/2017    HDL 40 10/27/2017    LDLCALC 103 10/27/2017    LABVLDL 28 10/27/2017     TSH:    Lab Results   Component Value Date    TSH 2.300 2018     VITAMIN B12: No components found for: B12  FOLATE:    Lab Results   Component Value Date    FOLATE <0.4 2011     Iron Saturation:  No components found for: PERCENTFE  FERRITIN:    Lab Results   Component Value Date    FERRITIN 527.2 2011     AMYLASE:  No results found for: AMYLASE  LIPASE:    Lab Results   Component Value Date    LIPASE 66 2018     24 Hour Urine for Protein:  No components found for: RAWUPRO, UHRS3, UPFV77PE, UTV3  24 Hour Urine for Creatinine Clearance:  No components found for: CREAT4, UHRS10, UTV10  PSA: No results found for: PSA     Imagin2018 4:15 PM               TECHNIQUE/NUMBER OF IMAGES/COMPARISON/CLINICAL HISTORY:       CT abdomen and pelvis       Axial with sagittal and coronal reconstructions       421 images       History is hematuria wound check in the right foot fall trauma injury.       Had previous right-sided nephrectomy. Has a abdominal aortic aneurysm   with intravascular repair/grafting. Had previous anterior abdominal   wall hernia repair. Has bilateral hip prosthesis.       Comparison is made with the study of 2016.       FINDINGS: There are markedly abnormal findings in the maribell hepatis   region with marked dilatation of the right and left intrahepatic   biliary tree/radicles. The common bile duct is not dilated, measures 2   mm in the head of the pancreas.       There is also marked abnormal appearance of the gallbladder which   appears to be distended with ill-defined walls and ill-defined   interface between the gallbladder and the liver parenchyma in the   region of the gallbladder fossa.  As observed on previous study   multiple gallstones are present.       The findings can be manifestation of a primary malignant infiltrative   of the biliary tree at the level of the high maribell hepatis causing   occlusion of the common hepatic duct at the confluence between the   right and the left main biliary radicals and of the cystic duct versus   primary malignancy of the gallbladder infiltrate in the high maribell   hepatis region and causing obstructive jaundice.       If additional information will be needed in clinical management prior   to an interventional procedure such as ERCP, MRI study multiphase   contrast can be in better advantage with MRCP.       There is a aneurysm of the abdominal aorta with endovascular repair   with the abdominal and the right and left iliac grafts.       The aneurysm measures approximately 5.3 x 6.7 cm in the transverse   diameter, previously it measured 5.2 x 6.3 cm. The aneurysm appears to   be slightly larger since the previous examination. This aneurysm is   surrounding the proximal segments of the right and left iliac grafts. The aneurysm continues into the proximal segment of the left the   common iliac artery measuring 5 x 6 cm which also appears to be   slightly larger since the previous study when it measured 5 x 5.4 cm.       As seen on the previous study there is a paramidline anterior   abdominal herniation in between the atrophic right rectus abdominal   muscle and the right transverse/oblique muscles. There are bowel   segments within the hernia sac but there is no signs for incarceration   or bowel obstruction.       There is some free fluid accumulation in the right lower pelvic cavity   and in the right upper quadrant around the liver and minimal around   the spleen. There is some increased vascularity of the omental fat   planes. These are somewhat borderline. There are also some stranding   of the cutaneous fat planes and retroperitoneal fat planes and   mesenteric fat planes which will be part of the anasarca condition.       Cannot evaluate the pelvic floor cavity.  Cannot evaluate the prostate   gland. Patient has a Dunn catheter in the bladder. Cannot evaluate   the bladder.       No free intraperitoneal observed.       Patient had previous right nephrectomy.       No obstructive uropathy in the left kidney       Presence of a 2 hyperdense complex lesions in the left kidney, also   observed on the previous study of February 2016, the largest one has a   smaller size.       Small left-sided pleural effusion are seen with areas of atelectasis   in the left base.       Degenerative changes are seen throughout the thoracic spine and lumbar   spine.           Impression   1. 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 See above comments, differential   diagnosis, and recommendations. 2. Some infiltration of the omental fat planes could also be   manifestation of a malignancy. 3. Mild ascites, could also be a manifestation of malignancy   considering the findings in the liver maribell hepatis/gallbladder fossa. 4. Status post right nephrectomy. 5. Presence of 2 complex hyperdense cysts or lesions in the left   kidney. No obstructive uropathy in the left kidney. 6. Aneurysm of the abdominal aorta into the left common iliac artery,   with endovascular repair, a slightly larger since the previous   examination.          Assessment  1-Stage II INDY due most probably to decreased effective renal perfusion in the setting of the hypotension as well as the torsemide-urine electrolytes for FeNa and FeUrea are ordered-will as the cr trending down continue the IVF    2-CKD G3B with a baseline serum cr 1.9-2.1mg/dl with an e-GFR+30-34ml/min in the setting of being S/P R Nephrectomy for Renal cell carcinoma, papillary type, Furhman's nuclear grade 2 in Feb 2011 with probable underlying microvascular disease given the Hx of the AAA with the Iliac Artery Stents as well as 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 hepatis-W/U in progress    7- Hypervolemia with the presence of the edema as well as the ascites and pulm vascular congestion-hold on diuresis for present with the INDY    8-Psoriasis currently on cyclosporine-will check level in the setting of the renal failure      Thank you  for allowing me to participate in care of Reji Tinoco  1:31 PM  6/27/2018

## 2018-06-28 NOTE — PROGRESS NOTES
BID    pravastatin  40 mg Oral Daily    sodium chloride flush  10 mL Intravenous 2 times per day     Continuous Infusions:   sodium chloride 100 mL/hr at 06/27/18 2231     PRN Meds:.perflutren lipid microspheres, sodium chloride flush, sodium chloride flush, magnesium hydroxide, ondansetron    ASSESSMENT:    Patient Active Problem List   Diagnosis    Ab[facility] without rupture ([facility]    Pure hypercholesterolemia    Essential hypertension    Chronic gout of multiple sites    Benign non-nodular prostatic hyperplasia with lower urinary tract symptoms    Post corneal transplant    Kidney mass    Psoriatic arthritis ([facility]    Gross hematuria    Acute renal failure (HCC)    Hypotension    Hypotension    Troponin level elevated    Abnormal EKG       PLAN:  Continue with heredia  tov when stable    Judy Chen M.D.  6/28/2018  7:00 AM

## 2018-06-28 NOTE — PROGRESS NOTES
strength  R LE 3- to 4-/ 5  L LE 3- to 3+/ 5    4- to 4/ 5    AM- PAC RAW score   6/ 24 8/24          Balance: sitting EOB SBA, standing with w/w Min A x 2. Patient education  Pt was educated on PT objectives during treatment session, hand placement during transfers. Patient response to education:   Pt verbalized understanding Pt demonstrated skill Pt requires further education in this area   Yes  With cueing yes     Additional Comments: Pt found in bed. Pt reported feeling lightheaded when initially standing which decreased with seated rest.  Cueing required for hand placement during transfers. Pt able to take small side steps toward [facility] with w/w. Pt was left in bed with call light left by patient. Chair/bed alarm: yes    Time in: 1119  Time out: 1136    Pt is making good progress toward established Physical Therapy goals. Continue with physical therapy current plan of care.     Eleanor Le, PT  License Number: [id number]

## 2018-06-28 NOTE — CARE COORDINATION
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 DISCUSSED WITH PT  PROJECTED COURSE OF CAR; PT  AWARE HE IS  IN CURRENT WORK-UP FOR\" KIDNEY AND LIVER  ISSUES\". DISCHARGE  PLAN IS  TENTATIVELTY RICHARD; LIST  WAS PROVIDED. WILL  FOLLOW  ALONG FOR  ANY  ADDITIONAL NEEDS. Suni Oviedo RN, CM.

## 2018-06-28 NOTE — PLAN OF CARE
Problem: Nutrition  Goal: Optimal nutrition therapy  Outcome: Ongoing  Nutrition Problem:  Moderate malnutrition, in context of chronic illness  Intervention: Food and/or Nutrient Delivery: Continue current diet, Start ONS  Nutritional Goals: PO at least 50% at meals and ONS, stable dry wt,

## 2018-06-28 NOTE — PROGRESS NOTES
[facility] Hospitalist Progress Note    Admitting Date and Time: 6/26/2018  3:46 PM  Admit Dx: Acute renal failure ([facility] [N17.9]  Acute renal failure ([facility] [address] [N17.9]  Hypotension [I95.9]    Subjective: Patient seen sitting up in bed, no acute distress  Denies pain, SOB, fever, chills, N/V/D  Reports no complaints today, states he was up out of bed earlier so he wants to just rest for now      ROS: denies fever, chills, cp, sob, n/v, HA unless stated above.       mupirocin   Topical Daily    calcium-vitamin D  1 tablet Oral BID    vitamin D  5,000 Units Oral Daily    cycloSPORINE modified  100 mg Oral Nightly    allopurinol  300 mg Oral Daily    finasteride  5 mg Oral Daily    nitrofurantoin  100 mg Oral BID    pravastatin  40 mg Oral Daily    sodium chloride flush  10 mL Intravenous 2 times per day       sodium chloride flush 10 mL PRN   sodium chloride flush 10 mL PRN   magnesium hydroxide 30 mL Daily PRN   ondansetron 4 mg Q6H PRN        Objective:    /64   Pulse 69   Temp 97.6 °F (36.4 °C) (Oral)   Resp 18   Ht 6' (1.829 m)   Wt 245 lb 4.8 oz (111.3 kg)   SpO2 99%   BMI 33.27 kg/m²   General Appearance: alert and oriented to person, place and time, no acute distress  Skin: warm and dry, scattered bruising   Head: normocephalic and atraumatic  Neck: neck supple and non tender without mass   Pulmonary/Chest: clear to auscultation bilaterally-On room air  Cardiovascular: normal rate, normal S1 and S2  Abdomen: soft, non-tender, non-distended, normal bowel sounds, no masses or organomegaly  Extremities: No joint deformities bilateral hands and feet, pes planus bilaterally, bilateral feet callus soft mild serous discharge no pus no erythema and +1 edema  Neurologic: speech normal   Dunn: draining, clear, yellow        Recent Labs      06/26/18   1620  06/27/18   0540  06/28/18   0345   NA  140  139  142   K  4.7  4.4  4.3   CL  102  106  109*   CO2  20*  17*  18*   BUN  85*  76*  67*   CREATININE 4. 2*  3.7*  3.3*   GLUCOSE  122*  149*  128*   CALCIUM  7.3*  6.5*  6.8*       Recent Labs      06/26/18   1620  06/27/18   0540  06/28/18   0345   WBC  8.2  7.9  8.4   RBC  4.03  3.55*  3.74*   HGB  10.7*  9.6*  9.9*   HCT  36.0*  31.6*  32.7*   MCV  89.3  89.0  87.4   MCH  26.6  27.0  26.5   MCHC  29.7*  30.4*  30.3*   RDW  17.0*  17.2*  17.3*   PLT  291  250  270   MPV  10.1  10.0  10.0           Assessment:    Principal Problem:    Hypotension  Active Problems:    Abdominal aortic aneurysm without rupture (HCC)    Essential hypertension    Kidney mass    Psoriatic arthritis (HCC)    Gross hematuria    Acute renal failure (HCC)    Troponin level elevated    Abnormal EKG  Resolved Problems:    * No resolved hospital problems. *      Plan:  1. Hypotension: etiology unknown. Could have been dehydration as the patient responded well to fluids. continue holding antihypertensive medication.  Despite the fact that the patient did not complain of any shortness of breath or chest pain, the fact that his blood pressure dropped abruptly with him feeling weak and with the presence of troponin elevation and Q waves in the anterior leads on concerned about the possibility of an MI, trend troponin. 2. Elevated troponin: might be type II MI due to hypertension and acute renal failure,troponin trending down. cardiology consulted. Per cardio, abnormal enzyme pattern-doubt MI. Echo showing mild LVH with EF 60%. 3.  Acute renal failure CKD stage III, most likely due to hypotension, CT scan of the abdomen showed no signs of obstruction or hydronephrosis, the patient does have one kidney which has 2 hyperdensities representing known cancer. Nephrology following. Continue IVF. 4.  Hematuria: might be related to kidney cancer, patient has a Heredia now, does not use a Heredia catheter at home. Urology following.  Will continue heredia until medically stable for voiding trial.    5.  HTN: holding antihypertensive medication due to

## 2018-06-28 NOTE — PROGRESS NOTES
steps toward head of bed (with walker); increased time needed. Other:  Patient demonstrates decreased independence with ADLs, decreased activity tolerance, decreased independence with bed mobility, decreased independence with functional mobility/transfers, decreased standing balance, and decreased strength. Patient would benefit from continued OT services upon discharge in order to maximize independence/safety with ADLs, functional transfers/mobility, and other functional tasks of choice in order to facilitate improved occupational performance. Education:  Patient education provided regardin) safe transfer techniques, 2) importance of having assist with functional mobility/transfers to minimize falls risk, 3) potential benefits of OT services, 4) OT POC, 5) potential benefits of participation in edge/out of bed activities. Patient verbalized understanding. Pain Level: No complaints of pain demonstrated/verbalized. Patient demonstrates Good rehab potential to progress toward goals of this OT POC. [x] Continue with current OT plan of care. [] Prepare for Discharge    SIMONE Morgan/L  License Number: [id number]    Total Treatment Time: 16 minutes

## 2018-06-28 NOTE — CONSULTS
mucous membranes, no ulcerations, no thrush. Neck: Supple to movements. No lymphadenopathy. Chest: No use of accessory muscles to breathe. Symmetrical expansion. Auscultation reveals no wheezing, crackles, or rhonchi. Cardiovascular: S1 and S2 are rhythmic and regular. No murmurs appreciated. Abdomen: Positive bowel sounds to auscultation. Benign to palpation. Extremities: No clubbing, no cyanosis, no edema. Hyperkeratotic callus formations on both arches. No surrounding erythema. The callus on the right has some bleeding points. Lines: peripheral  Dunn catheter in place with slightly bloody urine.     CBC+dif:  Recent Labs      06/26/18   1620  06/27/18   0540  06/28/18   0345   WBC  8.2  7.9  8.4   HGB  10.7*  9.6*  9.9*   HCT  36.0*  31.6*  32.7*   MCV  89.3  89.0  87.4   PLT  291  250  270   NEUTROABS  6.34   --   6.59     Lab Results   Component Value Date    CRP 4.1 (H) 06/26/2018      No results found for: CRP  Lab Results   Component Value Date    SEDRATE 17 (H) 06/26/2018     Lab Results   Component Value Date    ALT 86 (H) 06/28/2018     (H) 06/28/2018    ALKPHOS 820 (H) 06/28/2018    BILITOT 3.4 (H) 06/28/2018     Lab Results   Component Value Date     06/28/2018    K 4.3 06/28/2018    K 4.4 06/27/2018     06/28/2018    CO2 18 06/28/2018    BUN 67 06/28/2018    CREATININE 3.3 06/28/2018    GFRAA 22 06/28/2018    LABGLOM 18 06/28/2018    GLUCOSE 128 06/28/2018    GLUCOSE 106 03/19/2011    PROT 4.7 06/28/2018    LABALBU 2.4 06/28/2018    LABALBU 2.6 03/17/2011    CALCIUM 6.8 06/28/2018    BILITOT 3.4 06/28/2018    ALKPHOS 820 06/28/2018     06/28/2018    ALT 86 06/28/2018       Lab Results   Component Value Date    PROTIME 12.9 06/20/2018    PROTIME 18.0 03/19/2011    INR 1.1 06/20/2018       Lab Results   Component Value Date    TSH 2.300 06/26/2018       Lab Results   Component Value Date    NITRITE neg 07/20/2016    COLORU KATHERINE 06/26/2018    PHUR 5.0 06/26/2018

## 2018-06-28 NOTE — CARE COORDINATION
Social Work / Discharge Planning :SW and CM met with patient and explained roles as discharge planning / transition of care. CM discussed Plan of Care. Patient states she resides with his daughter and son in law. Patient states he uses a wheeled walker. Patient has a SNF hx at [facility] but not with [place] [id number]. Therapy input discussed 6/24 AM/PAC and patient receptive to SNF at this time. Patient realizes he is very weak. SNF list left with patient and he will discuss with son. SW offered to speak to the son as well and discuss SNF but patient did not want SW to contact stating\" he's busy right now\". N 17 generated and SW to follow up with SNF choices.  SW to follow Electronically signed by FERNY Alvarez on 6/28/2018 at 10:06 AM

## 2018-06-28 NOTE — PROGRESS NOTES
109 06/28/2018    CO2 18 06/28/2018    BUN 67 06/28/2018    LABALBU 2.4 06/28/2018    LABALBU 2.6 03/17/2011    CREATININE 3.3 06/28/2018    CALCIUM 6.8 06/28/2018    GFRAA 22 06/28/2018    LABGLOM 18 06/28/2018    GLUCOSE 128 06/28/2018    GLUCOSE 106 03/19/2011     Hepatic Function Panel:    Lab Results   Component Value Date    ALKPHOS 820 06/28/2018    ALT 86 06/28/2018     06/28/2018    PROT 4.7 06/28/2018    BILITOT 3.4 06/28/2018    BILIDIR 3.3 06/28/2018    IBILI 0.1 06/28/2018    LABALBU 2.4 06/28/2018    LABALBU 2.6 03/17/2011     Albumin:    Lab Results   Component Value Date    LABALBU 2.4 06/28/2018    LABALBU 2.6 03/17/2011     Calcium:    Lab Results   Component Value Date    CALCIUM 6.8 06/28/2018     Ionized Calcium:    Lab Results   Component Value Date    IONCA 1.28 02/14/2011     Magnesium:    Lab Results   Component Value Date    MG 1.9 06/28/2018     Phosphorus:    Lab Results   Component Value Date    PHOS 4.4 06/28/2018     LDH:  No results found for: LDH  Uric Acid:    Lab Results   Component Value Date    LABURIC 4.3 06/28/2018     PT/INR:    Lab Results   Component Value Date    PROTIME 12.9 06/20/2018    PROTIME 18.0 03/19/2011    INR 1.1 06/20/2018     PTT:    Lab Results   Component Value Date    APTT 25.0 06/20/2018   [APTT}  Troponin:    Lab Results   Component Value Date    TROPONINI 0.08 06/27/2018     U/A:    Lab Results   Component Value Date    NITRITE neg 07/20/2016    COLORU KATHERINE 06/26/2018    PROTEINU 30 06/26/2018    PHUR 5.0 06/26/2018    LABCAST MANY 06/26/2018    WBCUA 1-3 06/26/2018    WBCUA 5-10 03/16/2011    RBCUA 2-5 06/26/2018    RBCUA 0-1 03/16/2011    MUCUS Present 06/26/2018    BACTERIA RARE 06/26/2018    CLARITYU SL CLOUDY 06/26/2018    SPECGRAV 1.020 06/26/2018    LEUKOCYTESUR Negative 06/26/2018    UROBILINOGEN 1.0 06/26/2018    BILIRUBINUR MODERATE 06/26/2018    BILIRUBINUR neg 07/20/2016    BILIRUBINUR NEGATIVE 03/16/2011    BLOODU TRACE-INTACT 2018    GLUCOSEU Negative 2018    GLUCOSEU NEGATIVE 2011    AMORPHOUS RARE 2018     ABG:  No results found for: PH, PCO2, PO2, HCO3, BE, THGB, TCO2, O2SAT  HgBA1c:  No results found for: LABA1C  Microalbumen/Creatinine ratio:  No components found for: RUCREAT  FLP:    Lab Results   Component Value Date    TRIG 141 10/27/2017    HDL 40 10/27/2017    LDLCALC 103 10/27/2017    LABVLDL 28 10/27/2017     TSH:    Lab Results   Component Value Date    TSH 2.300 2018     VITAMIN B12: No components found for: B12  FOLATE:    Lab Results   Component Value Date    FOLATE 4.9 2018     Iron Saturation:  No components found for: PERCENTFE  FERRITIN:    Lab Results   Component Value Date    FERRITIN 527.2 2011     AMYLASE:  No results found for: AMYLASE  LIPASE:    Lab Results   Component Value Date    LIPASE 66 2018     24 Hour Urine for Protein:  No components found for: RAWUPRO, UHRS3, NJBA87RZ, UTV3  24 Hour Urine for Creatinine Clearance:  No components found for: CREAT4, UHRS10, UTV10  PSA: No results found for: PSA     Imagin2018 4:15 PM               TECHNIQUE/NUMBER OF IMAGES/COMPARISON/CLINICAL HISTORY:       CT abdomen and pelvis       Axial with sagittal and coronal reconstructions       421 images       History is hematuria wound check in the right foot fall trauma injury.       Had previous right-sided nephrectomy. Has a abdominal aortic aneurysm   with intravascular repair/grafting. Had previous anterior abdominal   wall hernia repair. Has bilateral hip prosthesis.       Comparison is made with the study of 2016.       FINDINGS: There are markedly abnormal findings in the maribell hepatis   region with marked dilatation of the right and left intrahepatic   biliary tree/radicles.  The common bile duct is not dilated, measures 2   mm in the head of the pancreas.       There is also marked abnormal appearance of the gallbladder which   appears to be distended with ill-defined walls and ill-defined   interface between the gallbladder and the liver parenchyma in the   region of the gallbladder fossa. As observed on previous study   multiple gallstones are present.       The findings can be manifestation of a primary malignant infiltrative   of the biliary tree at the level of the high maribell hepatis causing   occlusion of the common hepatic duct at the confluence between the   right and the left main biliary radicals and of the cystic duct versus   primary malignancy of the gallbladder infiltrate in the high maribell   hepatis region and causing obstructive jaundice.       If additional information will be needed in clinical management prior   to an interventional procedure such as ERCP, MRI study multiphase   contrast can be in better advantage with MRCP.       There is a aneurysm of the abdominal aorta with endovascular repair   with the abdominal and the right and left iliac grafts.       The aneurysm measures approximately 5.3 x 6.7 cm in the transverse   diameter, previously it measured 5.2 x 6.3 cm. The aneurysm appears to   be slightly larger since the previous examination. This aneurysm is   surrounding the proximal segments of the right and left iliac grafts. The aneurysm continues into the proximal segment of the left the   common iliac artery measuring 5 x 6 cm which also appears to be   slightly larger since the previous study when it measured 5 x 5.4 cm.       As seen on the previous study there is a paramidline anterior   abdominal herniation in between the atrophic right rectus abdominal   muscle and the right transverse/oblique muscles. There are bowel   segments within the hernia sac but there is no signs for incarceration   or bowel obstruction.       There is some free fluid accumulation in the right lower pelvic cavity   and in the right upper quadrant around the liver and minimal around   the spleen.  There is some increased vascularity of the omental fat   planes. These are somewhat borderline. There are also some stranding   of the cutaneous fat planes and retroperitoneal fat planes and   mesenteric fat planes which will be part of the anasarca condition.       Cannot evaluate the pelvic floor cavity. Cannot evaluate the prostate   gland. Patient has a Dunn catheter in the bladder. Cannot evaluate   the bladder.       No free intraperitoneal observed.       Patient had previous right nephrectomy.       No obstructive uropathy in the left kidney       Presence of a 2 hyperdense complex lesions in the left kidney, also   observed on the previous study of February 2016, the largest one has a   smaller size.       Small left-sided pleural effusion are seen with areas of atelectasis   in the left base.       Degenerative changes are seen throughout the thoracic spine and lumbar   spine.           Impression   1. 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 See above comments, differential   diagnosis, and recommendations. 2. Some infiltration of the omental fat planes could also be   manifestation of a malignancy. 3. Mild ascites, could also be a manifestation of malignancy   considering the findings in the liver maribell hepatis/gallbladder fossa. 4. Status post right nephrectomy. 5. Presence of 2 complex hyperdense cysts or lesions in the left   kidney. No obstructive uropathy in the left kidney. 6. Aneurysm of the abdominal aorta into the left common iliac artery,   with endovascular repair, a slightly larger since the previous   examination. 2D ECHO 6/28/18:   Summary   Left ventricular size is grossly normal.   Mild left ventricular concentric hypertrophy noted.   Ejection fraction is visually estimated at 60%.   No regional wall motion abnormalities seen.   Normal left ventricular diastolic filling pattern for age.       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 and a met Alkalosis as seen by the Delta AG:Delta HCO3=2-the AG most probably reflects the INDY-the alkalosis reflects the out pt torsemide-alkalosis resolving with the IVF and being off the loop diuretic    4- Hypocalcemia in the setting of the INDY and the Hypoalbumin-low ionized Ca++ 1.04 will po supplement-controlled PO4, elevated  in the setting of the hypocalcemia and low  Vit D<5 will supplement    5- Anemia in CKD --check Fe++, B12 and folate adequate , hold on WILIAM in the setting of the malignancies    6-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

## 2018-06-29 NOTE — PROGRESS NOTES
GENERAL SURGERY  DAILY PROGRESS NOTE  6/29/2018    Chief Complaint   Patient presents with   Hermelindo Mar c/o reports unable to walk well at home    Hematuria    Wound Check     right foot wound check    Fall     fell lastnight       Subjective:  Feels well, no issues    Objective:  /61   Pulse 70   Temp 97.7 °F (36.5 °C) (Oral)   Resp 18   Ht 6' (1.829 m)   Wt 244 lb 3.2 oz (110.8 kg)   SpO2 95%   BMI 33.12 kg/m²     GENERAL:  Laying in bed, awake, alert, cooperative, no apparent distress  HEAD: Normocephalic, atraumatic  EYES: No sclera icterus, pupils equal  LUNGS:  No increased work of breathing  CARDIOVASCULAR:  RR  ABDOMEN:  Soft, non-tender, non-distended  EXTREMITIES: No edema or swelling  SKIN: Warm and dry    Assessment/Plan:  81 y.o. male admitted sp fall with INDY on CKD, RCC sp R nephrectomy and L renal lesion being monitored, new liver mass    YESY  Tumor markers pending- CEA 60  Will need contrasted imaging to further assess- Cr still elevated at 3.1. Will obtain MRCP today without contrast. Bilirubin continuing to climb- 3.7 today with direct 3.5. Concern for obstructive process.  Will eventually require contrasted imaging when Cr improved    Electronically signed by Brunilda Treviño DO on 6/29/2018 at 8:47 AM

## 2018-06-29 NOTE — PROGRESS NOTES
REPLACEMENT Bilateral 1980, 1981?    hips    KIDNEY REMOVAL Right 02/2010    TONSILLECTOMY      TOTAL HIP ARTHROPLASTY         Family History   Problem Relation Age of Onset    High Blood Pressure Mother     Heart Disease Father         reports that he quit smoking about 37 years ago. He has a 30.00 pack-year smoking history. He has never used smokeless tobacco. He reports that he drinks alcohol. He reports that he does not use drugs. Allergies:  Latex; Codeine; Sulfa antibiotics; and Tape [adhesive tape]    Current Medications:      mupirocin (BACTROBAN) 2 % cream Daily   calcium-vitamin D (OSCAL-500) 500-200 MG-UNIT per tablet 1 tablet BID   vitamin D tablet 5,000 Units Daily   cycloSPORINE modified (NEORAL) capsule 100 mg Nightly   0.9 % sodium chloride infusion Continuous   sodium chloride flush 0.9 % injection 10 mL PRN   allopurinol (ZYLOPRIM) tablet 300 mg Daily   finasteride (PROSCAR) tablet 5 mg Daily   nitrofurantoin (MACRODANTIN) capsule 100 mg BID   pravastatin (PRAVACHOL) tablet 40 mg Daily   sodium chloride flush 0.9 % injection 10 mL 2 times per day   sodium chloride flush 0.9 % injection 10 mL PRN   magnesium hydroxide (MILK OF MAGNESIA) 400 MG/5ML suspension 30 mL Daily PRN   ondansetron (ZOFRAN) injection 4 mg Q6H PRN       Review of Systems:   Pertinent items are noted in HPI. Remainder of a complete review of systems is (-) other than stated above    Physical exam:   Constitutional:  Elderly male in NAD  Vitals:   VITALS:  /61   Pulse 70   Temp 97.7 °F (36.5 °C) (Oral)   Resp 18   Ht 6' (1.829 m)   Wt 244 lb 3.2 oz (110.8 kg)   SpO2 95%   BMI 33.12 kg/m²   24HR INTAKE/OUTPUT:      Intake/Output Summary (Last 24 hours) at 06/29/18 1207  Last data filed at 06/29/18 1037   Gross per 24 hour   Intake             1451 ml   Output              950 ml   Net              501 ml     URINARY CATHETER OUTPUT (Dunn):  Urethral Catheter-Output (mL): 450 mL  DRAIN/TUBE OUTPUT:     VENT SETTINGS:     Additional Respiratory  Assessments  Pulse: 70  Resp: 18  SpO2: 95 %    Skin: no rash, turgor wnl  Heent:  eomi, mmm, nc,at  Neck: no bruits or jvd noted, no thyromegaly  Cardiovascular: PMI lat displaced S1, S2 without S3 or rub  Respiratory: CTA B without w/r/r  Abdomen:  +bs, soft, nt, nd, no HSM  Ext: trace lower extremity edema bilat, RLE foot bandaged, ecchymosis on the LLE  Psychiatric: mood and affect appropriate, cr nr 2-12 grossly intact      Data:   Labs:  CBC:   Lab Results   Component Value Date    WBC 9.7 06/29/2018    RBC 3.78 06/29/2018    HGB 10.4 06/29/2018    HCT 33.7 06/29/2018    MCV 89.2 06/29/2018    MCH 27.5 06/29/2018    MCHC 30.9 06/29/2018    RDW 18.1 06/29/2018     06/29/2018    MPV 10.2 06/29/2018     CBC with Differential:    Lab Results   Component Value Date    WBC 9.7 06/29/2018    RBC 3.78 06/29/2018    HGB 10.4 06/29/2018    HCT 33.7 06/29/2018     06/29/2018    MCV 89.2 06/29/2018    MCH 27.5 06/29/2018    MCHC 30.9 06/29/2018    RDW 18.1 06/29/2018    SEGSPCT 67 03/01/2011    METASPCT 1 02/25/2011    LYMPHOPCT 7.4 06/29/2018    MONOPCT 9.6 06/29/2018    BASOPCT 0.4 06/29/2018    MONOSABS 0.93 06/29/2018    LYMPHSABS 0.72 06/29/2018    EOSABS 0.10 06/29/2018    BASOSABS 0.04 06/29/2018     Hemoglobin/Hematocrit:    Lab Results   Component Value Date    HGB 10.4 06/29/2018    HCT 33.7 06/29/2018     CMP:    Lab Results   Component Value Date     06/29/2018    K 4.3 06/29/2018    K 4.4 06/27/2018     06/29/2018    CO2 18 06/29/2018    BUN 65 06/29/2018    CREATININE 3.1 06/29/2018    GFRAA 23 06/29/2018    LABGLOM 19 06/29/2018    GLUCOSE 128 06/29/2018    GLUCOSE 106 03/19/2011    PROT 4.7 06/29/2018    LABALBU 2.3 06/29/2018    LABALBU 2.6 03/17/2011    CALCIUM 7.1 06/29/2018    BILITOT 3.7 06/29/2018    ALKPHOS 866 06/29/2018     06/29/2018    ALT 97 06/29/2018     BMP:    Lab Results   Component Value Date     06/29/2018    K 2011    BLOODU TRACE-INTACT 2018    GLUCOSEU Negative 2018    GLUCOSEU NEGATIVE 2011    AMORPHOUS RARE 2018     ABG:  No results found for: PH, PCO2, PO2, HCO3, BE, THGB, TCO2, O2SAT  HgBA1c:  No results found for: LABA1C  Microalbumen/Creatinine ratio:  No components found for: RUCREAT  FLP:    Lab Results   Component Value Date    TRIG 141 10/27/2017    HDL 40 10/27/2017    LDLCALC 103 10/27/2017    LABVLDL 28 10/27/2017     TSH:    Lab Results   Component Value Date    TSH 2.300 2018     VITAMIN B12: No components found for: B12  FOLATE:    Lab Results   Component Value Date    FOLATE 4.9 2018     Iron Saturation:  No components found for: PERCENTFE  FERRITIN:    Lab Results   Component Value Date    FERRITIN 426 2018     AMYLASE:  No results found for: AMYLASE  LIPASE:    Lab Results   Component Value Date    LIPASE 66 2018     24 Hour Urine for Protein:  No components found for: RAWUPRO, UHRS3, ZAFW42HX, UTV3  24 Hour Urine for Creatinine Clearance:  No components found for: CREAT4, UHRS10, UTV10  PSA: No results found for: PSA     Imagin2018 4:15 PM               TECHNIQUE/NUMBER OF IMAGES/COMPARISON/CLINICAL HISTORY:       CT abdomen and pelvis       Axial with sagittal and coronal reconstructions       421 images       History is hematuria wound check in the right foot fall trauma injury.       Had previous right-sided nephrectomy. Has a abdominal aortic aneurysm   with intravascular repair/grafting. Had previous anterior abdominal   wall hernia repair. Has bilateral hip prosthesis.       Comparison is made with the study of 2016.       FINDINGS: There are markedly abnormal findings in the maribell hepatis   region with marked dilatation of the right and left intrahepatic   biliary tree/radicles.  The common bile duct is not dilated, measures 2   mm in the head of the pancreas.       There is also marked abnormal appearance of the some increased vascularity of the omental fat   planes. These are somewhat borderline. There are also some stranding   of the cutaneous fat planes and retroperitoneal fat planes and   mesenteric fat planes which will be part of the anasarca condition.       Cannot evaluate the pelvic floor cavity. Cannot evaluate the prostate   gland. Patient has a Dunn catheter in the bladder. Cannot evaluate   the bladder.       No free intraperitoneal observed.       Patient had previous right nephrectomy.       No obstructive uropathy in the left kidney       Presence of a 2 hyperdense complex lesions in the left kidney, also   observed on the previous study of February 2016, the largest one has a   smaller size.       Small left-sided pleural effusion are seen with areas of atelectasis   in the left base.       Degenerative changes are seen throughout the thoracic spine and lumbar   spine.           Impression   1. 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 See above comments, differential   diagnosis, and recommendations. 2. Some infiltration of the omental fat planes could also be   manifestation of a malignancy. 3. Mild ascites, could also be a manifestation of malignancy   considering the findings in the liver maribell hepatis/gallbladder fossa. 4. Status post right nephrectomy. 5. Presence of 2 complex hyperdense cysts or lesions in the left   kidney. No obstructive uropathy in the left kidney. 6. Aneurysm of the abdominal aorta into the left common iliac artery,   with endovascular repair, a slightly larger since the previous   examination. 2D ECHO 6/28/18:   Summary   Left ventricular size is grossly normal.   Mild left ventricular concentric hypertrophy noted.   Ejection fraction is visually estimated at 60%.   No regional wall motion abnormalities seen.   Normal left ventricular diastolic filling pattern for age.       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 with probable underlying microvascular disease given the Hx of the AAA with the Iliac Artery Stents as well as 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 and a met Alkalosis as seen by the initial Delta AG:Delta HCO3=2-the AG most probably reflects the INDY-the alkalosis reflects the out pt torsemide-alkalosis resolved with the IVF and being off the loop diuretic and now when corrected for the hypoalbuminemia pt has an AGMA in the setting of the INDY-follow    4- Hypocalcemia in the setting of the INDY and the Hypoalbumin-low ionized Ca++ 1.11improving post  po supplement-controlled PO4, elevated  in the setting of the hypocalcemia and low  Vit D<5  On supplement    5- Anemia in CKD -- Ferritin 426 with an iron sat 27%, B12 and folate adequate , hold on WILIAM in the setting of the malignancies    6-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

## 2018-06-29 NOTE — CARE COORDINATION
Social Work / Discharge Planning :SW met with patient this am to receive SNF choices. Patient informed SW that he threw up twice last night and that the foot doctor was in and may want to operate on his feet while he is here. SW again discussed patient goals at discharge home vs SNF and patient stated  \" well I dont know that\"\". SW discussed again updated therapy input am/pac 8/24. SW explained at length that we need to need to be pro-active in securing a SNF so he can be transitioned smootly to an appropriate level of care at discharge. Patient refused to give SW any SNF choice. SW informed patient she will be back later this afternoon for choices. SW asked patient if SW can contact family to see if they can assist him in SNF choices. He stated\" no they are busy. \" SW to re-approach. CM updated. .Electronically signed by FERNY Owusu on 6/29/2018 at 9:32 AM     Addendum : SW and CM followed up with patient to discuss again discharge plans and therapy input. Patient daughter and son in law were both present in the room. Family informed RIVKA and CM that  they are patient primary caregivers for the past 7 years and if patient can go from bed to bathroom to chair and then back to bed, then they can take him home. [facility] discussed and patient had [facility] 7 years ago but they cannot recall name. SW again explained services that are available if daughter needs assistance at discharge to care for patient : SNF vs HHC. Family requerted to follow up with them Monday. If [address] is ordered, they did not state preference.  SW to follow Electronically signed by FERNY Owusu on 6/29/2018 at 2:44 PM

## 2018-06-29 NOTE — PROGRESS NOTES
medically stable for voiding trial.    5.  HTN: holding antihypertensive medication due to hypotension. 6.  CT finding of questionable malignancy in the maribell hepatis/gallbladder fossa with obstructive malignant jaundice, with transaminitis on admission. Labs worsening. Bili now 3.7, this may represent malignancy primary or secondary may be due to kidney cancer. MRCP suggesting possible [place] tumor. Tumor markers elevated. GI consulted for possible ERCP. 7.  History of kidney cancer s/p right nephrectomy 7 years ago: patient reports a known cancer lesion on the left kidney that has been shrinking in size, CT scan of the abdomen obtained  showing 2 hyperdensities in left kidney with questionable metastasis in the omental fat. 8.  Code status patient is DNR CCA, he does not want any intubation unless a surgical procedure is planned  9.  Bilateral feet calluses and ulcers: podiatry consulted. Hold off on antibiotics for now. 10. CoNS Bacteremia: Likely a contaminant per ID. ID signed off case   11. Weakness: PT/OT AM-PAC 6/24        Electronically signed by EBONY Tabares CNP on 6/29/2018 at 3:30 PM    HOSPITALIST ATTENDING PHYSICIAN NOTE 6/29/2018 1710PM:    Details of the evaluation - subjective assessment (including medication profile, past medical, family and social history when applicable), examination, review of lab and test data, diagnostic impressions and medical decision making - performed by EBONY Tabares CNP, were discussed with me on the date of service and I agree with clinical information herein unless otherwise noted. The patient has been evaluated by me personally earlier today. Pt reports no fevers, chills,n/v, pain or sob.      Exam: heart reg at rate of 72,lungs cta, abd pos bs soft nt, ext pos for 1-2+ b/l le edema    I agree with the assessment and plan of EBONY Tabares CNP    Liver mass with signs of malig in the maribell hepatis/gallbladder fossa with obstructive malig jaundice  Jaundice likely obstructive  Hypotension  Dehydration  acidosis  ambrocio  Hematuria  htn  Abnormal ct  H/o kidney ca s/p right nephrectomy   Elevated troponin   Moderate protein calorie malnutriton  psoriasis        Electronically signed by Gregory Glaser D.O.   Hospitalist  4[facility] Hospitalist Service at [facility]

## 2018-06-29 NOTE — PROGRESS NOTES
Physical Therapy    Pt refused Rx this PM, states eating a late lunch while in bed, offered for Pt to get OOB to eat. Pt states prefers to stay in bed. Wishes respected.      Cammy Sacks PTA [id number]

## 2018-06-29 NOTE — CONSULTS
with the MRI  Labs today: Alb 2.3; ; ALT 97; ; Bili 3.7; D Bili 3.5; Tot Pro 4.7; ; H&H 10.4 & 33.7; RBC 3.78; MCHC 30.9; RDW 18.1; Lymph 7.4%; Abs Neut 7.72; Iron 44; TIBC 162; Iron Sat 27; cl 111; BUN 65; Cr 3.1; Ion Ca 1.11; ; Ca 7.1. Past Medical History:        Diagnosis Date    Abdominal aortic aneurysm without rupture ([facility] 1/18/2016    Acute renal failure ([facility] [address] 6/26/2018    [facility]     kidney    Gout     Hyperlipidemia     Hypertension     Osteoarthritis     Psoriasis      Past Surgical History:        Procedure Laterality Date    ABDOMEN SURGERY  2015    mesh inserted    ABDOMINAL AORTIC ANEURYSM REPAIR, ENDOVASCULAR  01/25/2016    Excluder, Delatore    COLONOSCOPY      JOINT REPLACEMENT Bilateral 1980, 1981?    hips    KIDNEY REMOVAL Right 02/2010    TONSILLECTOMY      TOTAL HIP ARTHROPLASTY       Current Medications:    Current Facility-Administered Medications: mupirocin (BACTROBAN) 2 % cream, , Topical, Daily  calcium-vitamin D (OSCAL-500) 500-200 MG-UNIT per tablet 1 tablet, 1 tablet, Oral, BID  vitamin D tablet 5,000 Units, 5,000 Units, Oral, Daily  cycloSPORINE modified (NEORAL) capsule 100 mg, 100 mg, Oral, Nightly  0.9 % sodium chloride infusion, , Intravenous, Continuous  sodium chloride flush 0.9 % injection 10 mL, 10 mL, Intravenous, PRN  allopurinol (ZYLOPRIM) tablet 300 mg, 300 mg, Oral, Daily  finasteride (PROSCAR) tablet 5 mg, 5 mg, Oral, Daily  nitrofurantoin (MACRODANTIN) capsule 100 mg, 100 mg, Oral, BID  pravastatin (PRAVACHOL) tablet 40 mg, 40 mg, Oral, Daily  sodium chloride flush 0.9 % injection 10 mL, 10 mL, Intravenous, 2 times per day  sodium chloride flush 0.9 % injection 10 mL, 10 mL, Intravenous, PRN  magnesium hydroxide (MILK OF MAGNESIA) 400 MG/5ML suspension 30 mL, 30 mL, Oral, Daily PRN  ondansetron (ZOFRAN) injection 4 mg, 4 mg, Intravenous, Q6H PRN    Allergies:  Latex;  Codeine; Sulfa antibiotics; and Tape Arjun Mitts tape]    Social History:    Tobacco:  Pt reports he quit smoking cigarettes 36 years ago. Prior to that he smoked cigarettes greater than 1 pack per day for 20 years. He denies use of smokeless tobacco.  Alcohol:  Pt reports he drinks 1 glass of wine a year. Illicit Drugs: Pt denies. Family History: Mother  at the age of 80, CAD and hypertension. Father  at the age of 52, MI. One sister  in her [de-identified] from \"old age\"  1 daughter living, she is in a wheelchair from [address]. 1 daughter living, POTS. One son living with hypertension. REVIEW OF SYSTEMS:    Aside from what was mentioned in the PMH and HPI, essentially unremarkable, all others negative. PHYSICAL EXAM:      Vitals:    /61   Pulse 70   Temp 97.7 °F (36.5 °C) (Oral)   Resp 18   Ht 6' (1.829 m)   Wt 244 lb 3.2 oz (110.8 kg)   SpO2 95%   BMI 33.12 kg/m²       CONSTITUTIONAL:  awake, fatigue appearing, pale, cooperative, and appears stated age  EYES:  pupils equal, round and reactive to light, sclera icteric bilaterally and conjunctiva pale  ENT:  normocephalic, oral pharynx with moist mucous membranes  NECK:  supple   LUNGS:  diminished to auscultation bilaterally.   CARDIOVASCULAR:  regular rate and rhythm, no murmur noted; 2+ pulses; BLE wrapped in gauze to mid ankle, 2+ BLE  ABDOMEN:  Normal bowel sounds, softly distended, non-tender, no masses palpated, no hepatosplenomegaly noted  MUSCULOSKELETAL:  full range of motion noted; motor strength is 5/5 BUE; 3/5 BLE  NEUROLOGIC:  Mental Status Exam:  Level of Alertness:   awake  Orientation:   person, place, time  Motor Exam:  motor strength is 5/5 BUE; 3/5 BLE  SKIN:  jaundiced skin color     DATA:    CBC with Differential:    Lab Results   Component Value Date    WBC 9.7 2018    RBC 3.78 2018    HGB 10.4 2018    HCT 33.7 2018     2018    MCV 89.2 2018    MCH 27.5 2018    MCHC 30.9 2018    RDW 18.1 2018 HIV SEE BELOW 2011     CATHI:  No results found for: ANATITER, CATHI  No components found for: CHLPL  Lab Results   Component Value Date    TRIG 141 10/27/2017    TRIG 164 (H) 2016    TRIG 138 2016     Lab Results   Component Value Date    HDL 40 10/27/2017    HDL 37 2016    HDL 39 2016     Lab Results   Component Value Date    LDLCALC 103 (H) 10/27/2017    LDLCALC 94 2016    LDLCALC 95 2016     Lab Results   Component Value Date    LABVLDL 28 10/27/2017    LABVLDL 33 2016    LABVLDL 28 2016        Ct Abdomen Pelvis Wo Contrast Additional Contrast? None    Result Date: 2018  Patient MRN:  [id number] : 1935 Age: 81 years Gender: Male Order Date:  2018 4:15 PM TECHNIQUE/NUMBER OF IMAGES/COMPARISON/CLINICAL HISTORY: CT abdomen and pelvis Axial with sagittal and coronal reconstructions 421 images History is hematuria wound check in the right foot fall trauma injury. Had previous right-sided nephrectomy. Has a abdominal aortic aneurysm with intravascular repair/grafting. Had previous anterior abdominal wall hernia repair. Has bilateral hip prosthesis. Comparison is made with the study of 2016. FINDINGS: There are markedly abnormal findings in the maribell hepatis region with marked dilatation of the right and left intrahepatic biliary tree/radicles. The common bile duct is not dilated, measures 2 mm in the head of the pancreas. There is also marked abnormal appearance of the gallbladder which appears to be distended with ill-defined walls and ill-defined interface between the gallbladder and the liver parenchyma in the region of the gallbladder fossa. As observed on previous study multiple gallstones are present.  The findings can be manifestation of a primary malignant infiltrative of the biliary tree at the level of the high maribell hepatis causing occlusion of the common hepatic duct at the confluence between the right and the left main acute fracture. No lytic or blastic bony lesion. ALERT:  THIS IS AN ABNORMAL REPORT 1. Severe soft tissue swelling. 2. Suspected large joint effusion. 3. Abnormal soft tissue density posterior to the tibiotalar joint and superior to the calcaneus of indeterminate etiology and significance. Correlation with CT scan recommended. 4. Mild tibiotalar osteoarthritis. Xr Ankle Right (2 Views)    Result Date: 2018  Patient MRN: [id number] : 1935 Age:  81 years Gender: Male Order Date: 2018 4:30 PM Exam: XR ANKLE RIGHT (2 VIEWS) Number of Views: 2 Indication:  Wounds on the bottom of both feet. Urinary tract infection. Bowel. Comparison: None. Findings: Extensive severe soft tissue swelling about the ankle. Slight widening of the ankle mortise in the craniocaudal dimension laterally as compared to medially. Moderate degenerative changes at the talonavicular joint. Limited evaluation of the foot, findings are suggestive of a degree of mid foot collapse. ALERT:  THIS IS AN ABNORMAL REPORT 1. Findings are suggestive of a potential degree of mid foot collapse or possible talus head/neck fracture or potential fracture of tarsal bones. Further evaluation with CT of the ankle and foot are recommended. 2. Extensive, severe soft tissue swelling about the ankle. 3. Slight widening of the ankle mortise in the craniocaudal dimension, laterally as compared to medially, of indeterminate etiology and significance. 4. Moderate talonavicular osteoarthritis. Xr Foot Left (min 3 Views)    Result Date: 2018  Patient MRN:  [id number] : 1935 Age: 81 years Gender: Male Order Date:  2018 4:15 PM TECHNIQUE/NUMBER OF IMAGES/COMPARISON/CLINICAL HISTORY: X-ray series of the foot the left frontal lateral and oblique projections History osteomyelitis. 3 images, 3 views. FINDINGS: Generalized osteopenia is seen.  There is diffuse mild soft tissue swelling in the distal left lower extremity through the ankle 6/27/2018 5:45 AM EXAM: US ABDOMEN COMPLETE, US DUP ABD PEL RETRO SCROT COMPLETE NUMBER OF IMAGES:  133 INDICATION:  concern for hepatobiliary malignancy. Please perform w doppler attn: polina venous, hepatic venous, and hepatic arterial flow COMPARISON: CT scan abdomen 6/26/2018 The pancreas appears unremarkable. Liver demonstrates evidence for biliary dilatation The proximal aorta and the proximal IVC appear   to be poorly seen secondary to bowel gas and body habitus. The right kidney appears absent The common duct is enlarged. The gallbladder wall appears to be thickened. Calculi are identified Sludge is identified. The left kidney  is abnormal demonstrates a mass measuring 2.4 x 2.2 x 2.1 cm The spleen is unremarkable. Doppler ultrasound demonstrates hepatic and portal vein to be patent No definite ascites is seen     Solid mass in the left kidney neoplasm cannot be excluded Dilated biliary ducts Cholelithiasis, I cannot exclude cholecystitis although the gallbladder appears to be completely filled with calculi       IMPRESSION:  · Liver mass -signs for malignancy in the maribell hepatis/gallbladder fossa with obstructive malignant jaundice (CT)  · Jaundice, likely obstructive  · Elevated tumor markers (CEA 59.8; CA 19-9 1373)  · Elevated LFTs  · Loss of appetite  · Unintentional weight loss  · Anemia, hypochromic  · Hematuria-Urology following  · INDY on CKD-nephrology following  · Hx Kidney CA with right nephrectomy    RECOMMENDATIONS:    · For MRCP today-await results, ERCP to be scheduled if indicated. · Monitor lft's, amylase, lipase, and cbc/diff  · Defer co-morbidities to others  · IV fluids per nephrology/pcp  · Continue to monitor    Thank you very much for your consultation. We will follow closely with you.     Discussed with Dr. Silvina Fernandez developed by Dr. Juarez Lemus [id number], CNP 6/29/2018 2:19 PM for Dr. Diane Sauer EVALUATION ON THIS PATIENT. I HAVE REVIEWED AND AGREE WITH THE PLAN OF CARE. REVIEWED LABS/RADIOLOGY. HISTORY AND EXAM BY ME SHOWS: ABDOMEN IS SOFT, LAX, AND NON-TENDER. FINDINGS CONSISTENT WITH POSSIBLE BILIARY NEOPLASM VS LYMPH NODE ENLARGEMENT WITH COMPRESSION ON BILIARY TREE SECONDARY TO POSSIBLE RENAL CELL CARCINOMA. ASCITES ? MALIGNANT. WITH HIS RISING BILIRUBIN, AN ERCP IS INDICATED, WILL PLAN FOR Monday. ALL D/W PT AND SON BY THE BS WITH ALL THEIR QUESTIONS ANSWERED. WILL FOLLOW.

## 2018-06-29 NOTE — PROGRESS NOTES
diagnosis, and recommendations. 2. Some infiltration of the omental fat planes could also be  manifestation of a malignancy. 3. Mild ascites, could also be a manifestation of malignancy  considering the findings in the liver maribell hepatis/gallbladder fossa. 4. Status post right nephrectomy. 5. Presence of 2 complex hyperdense cysts or lesions in the left kidney. No obstructive uropathy in the left kidney. 6. Aneurysm of the abdominal aorta into the left common iliac artery, with endovascular repair, a slightly larger since the previous Examination. 6/26/18 XR left foot:   1. Degenerative changes predominantly seen in the subtalar joints can be related with neuropathic joint. 2. Cannot conspicuously identify an area of an aggressive destructive bone process to indicate active osteomyelitis. 6/26/18 CT head:   1. No CT evidence of acute intracranial abnormality, as questioned. If there is further clinical concern, MRI of the brain would be recommended for more detailed evaluation. .  2. Moderate cerebral volume loss/atrophy. 3. Moderate white matter changes consistent with sequelae of small vessel ischemic disease. 4. Vascular calcifications within the bilateral internal carotid  artery cavernous segments. .  5. Please see CT cervical spine report given the cervical spine is incompletely imaged on this exam.    6/26/18 CXR:   1. Central pulmonary vascular congestion suspected. 2. Moderately severe left glenohumeral osteoarthritis. 3. No definitive acute fracture on this severely limited fracture  evaluation exam.    Microbiology:   No new cultures  6/28/18 wound right foot: Growth not present, incubation continues  6/28/18 wound left foot: Growth not present, incubation continues  6/26/18 BC CoNS in 1 of 2 sets  6/26/18 Urine Growth not present    ASSESSMENT:  · Hematuria associated to renal cell carcinoma  · No evidence of UTI. Patient still on nitrofurantoin  · Bilateral feet callus formations.  No evidence of infection. Cultures taken as outpatient. Probably colonized  · CoNS bacteremia. This turned +2 days after he cultures were drawn. This is a contaminant. · Hypotension. Probable relative adrenal insufficiency. Currently not on steroids  · Cholelithiasis, surgery following, concern for obstruction, for MRCP today    PLAN:  · No further recommendations. We will be signing off the case    Larry EBONY Larson  11:04 AM  6/29/2018     Patient seen and examined. I had a face to face encounter with the patient. Agree with exam, assessment and plan as outlined above. Addition and corrections were done as deemed appropriate.    Dung Randolph  6/29/2018

## 2018-06-30 NOTE — ANESTHESIA PRE PROCEDURE
Department of Anesthesiology  Preprocedure Note       Name:  Alfreda Guidry   Age:  81 y.o.  :  1935                                          MRN:  [id number]         Date:  2018      Surgeon: Dr. Jessica Mccann  Procedure: ERCP    Medications prior to admission:   Prior to Admission medications    Medication Sig Start Date End Date Taking?  Authorizing Provider   nitrofurantoin (MACRODANTIN) 100 MG capsule Take 100 mg by mouth 2 times daily   Yes Historical Provider, MD   allopurinol (ZYLOPRIM) 300 MG tablet Take 1 tablet by mouth daily 18  Yes Garett Washintgon PA-C   atenolol (TENORMIN) 25 MG tablet Take 1 tablet by mouth 2 times daily 18  Yes Garett Washington PA-C   finasteride (PROSCAR) 5 MG tablet Take 1 tablet by mouth daily Takes in the evening 18  Yes Garett [place] PA-C   hydrALAZINE (APRESOLINE) 10 MG tablet Take 1 tablet by mouth daily 18  Yes Garett [place] PA-C   pravastatin (PRAVACHOL) 40 MG tablet Take 1 tablet by mouth daily 18  Yes Garett [place] PA-C   cycloSPORINE modified (NEORAL) 100 MG capsule Take 1 capsule by mouth daily Takes for arthritis and psoriasis 10/27/17  Yes Garett [place] PA-C   torsemide (DEMADEX) 20 MG tablet Take 1 tablet by mouth every other day 10/27/17  Yes Garett [place] PA-C       Current medications:    Current Facility-Administered Medications   Medication Dose Route Frequency Provider Last Rate Last Dose    docusate sodium (COLACE) capsule 100 mg  100 mg Oral Daily Giovanna A Sugar, APRN - CNP   100 mg at 18 1426    ampicillin-sulbactam (UNASYN) 3 g ivpb minibag  3 g Intravenous See Admin Instructions Magaly Eglin, APRN - CNS        mupirocin (BACTROBAN) 2 % cream   Topical Daily Barrera Barnett DPM        calcium-vitamin D (OSCAL-500) 500-200 MG-UNIT per tablet 1 tablet  1 tablet Oral BID Italo Oliver MD   1 tablet at 18 1909    vitamin D tablet 5,000 Units  5,000 Units Oral Daily Italo Oliver MD   5,000 Units at 03/19/2011    PROT 4.7 06/30/2018    CALCIUM 7.4 06/30/2018    BILITOT 4.7 06/30/2018    ALKPHOS 901 06/30/2018     06/30/2018    ALT 95 06/30/2018       POC Tests: No results for input(s): POCGLU, POCNA, POCK, POCCL, POCBUN, POCHEMO, POCHCT in the last 72 hours. Coags:   Lab Results   Component Value Date    PROTIME 13.9 06/30/2018    PROTIME 18.0 03/19/2011    INR 1.2 06/30/2018    APTT 28.3 06/30/2018       HCG (If Applicable): No results found for: PREGTESTUR, PREGSERUM, HCG, HCGQUANT     ABGs: No results found for: PHART, PO2ART, GDS4CRB, QFR7KFC, BEART, B0CRNPAQ     Type & Screen (If Applicable):  No results found for: [facility]    Anesthesia Evaluation  Patient summary reviewed no history of anesthetic complications:   Airway: Mallampati: III  TM distance: >3 FB   Neck ROM: full  Mouth opening: > = 3 FB Dental:      Comment: Crowns, capped teeth. Pulmonary:Negative Pulmonary ROS breath sounds clear to auscultation                             Cardiovascular:    (+) hypertension (However, has had recent hypotension.):,       ECG reviewed  Rhythm: regular  Rate: normal  Echocardiogram reviewed  Stress test reviewed  Cleared by cardiology              Neuro/Psych:                ROS comment: Corneal transplant. GI/Hepatic/Renal:   (+) renal disease: ARF,          ROS comment: S/P Right nephrectomy--renal cancer. Liver mass. Jaundice    Hematuria. .   Endo/Other:    (+) blood dyscrasia: anemia, arthritis:., .                 Abdominal:           Vascular:           ROS comment: AAA--endovascular repair. Anesthesia Plan      MAC     ASA 4     (Pt agrees to [facility] ATHENS and IV sedation. He is DNR-CC, but agrees to lift this code status in the perioperative period. Note: Latex allergy.)  Induction: intravenous. Anesthetic plan and risks discussed with patient.                     Rosa Bernal MD   6/30/2018

## 2018-06-30 NOTE — PROGRESS NOTES
[facility] Hospitalist Progress Note    Admitting Date and Time: 6/26/2018  3:46 PM  Admit Dx: Acute renal failure ([facility] [N17.9]  Acute renal failure ([facility] [address] [facility] [N17.9]  Hypotension [I95.9]    Subjective: Patient seen resting in bed, no acute distress  Visiting with daughter  Reports slight abdominal tenderness  Has not had a BM since he was admitted  Discussed MRI findings again with patient and with daughter per his request      ROS: denies fever, chills, cp, sob, n/v, HA unless stated above.       ampicillin-sulbactam  3 g Intravenous See Admin Instructions    mupirocin   Topical Daily    calcium-vitamin D  1 tablet Oral BID    vitamin D  5,000 Units Oral Daily    cycloSPORINE modified  100 mg Oral Nightly    allopurinol  300 mg Oral Daily    finasteride  5 mg Oral Daily    nitrofurantoin  100 mg Oral BID    pravastatin  40 mg Oral Daily    sodium chloride flush  10 mL Intravenous 2 times per day       docusate sodium 100 mg Daily PRN   sodium chloride flush 10 mL PRN   sodium chloride flush 10 mL PRN   magnesium hydroxide 30 mL Daily PRN   ondansetron 4 mg Q6H PRN        Objective:    /76   Pulse 74   Temp 97.5 °F (36.4 °C) (Oral)   Resp 18   Ht 6' (1.829 m)   Wt 243 lb 9.6 oz (110.5 kg)   SpO2 96%   BMI 33.04 kg/m²   General Appearance: alert and oriented to person, place and time, no acute distress  Skin: warm and dry, scattered bruising   Head: normocephalic and atraumatic  Neck: neck supple and non tender without mass   Pulmonary/Chest: clear to auscultation bilaterally-On room air  Cardiovascular: normal rate, normal S1 and S2  Abdomen: soft, mild generalized tenderness, non-distended, normal bowel sounds, no masses or organomegaly  Extremities: No joint deformities bilateral hands and feet, pes planus bilaterally, bilateral feet callus soft mild serous discharge no pus no erythema and +1 edema  Neurologic: speech normal   Dunn: draining, donald urine        Recent Labs      06/28/18   [id number]

## 2018-06-30 NOTE — PROGRESS NOTES
(110.5 kg)   SpO2 96%   BMI 33.04 kg/m²     Lab Results   Component Value Date    WBC 11.4 06/30/2018    HGB 10.5 (L) 06/30/2018    HCT 33.5 (L) 06/30/2018    MCV 88.2 06/30/2018     06/30/2018       Lab Results   Component Value Date    CREATININE 3.2 (H) 06/30/2018       No results found for: PSA        PHYSICAL EXAMINATION:  Skin dry, without rashes  Respirations non-labored, intact  Abdomen soft, non-tender, non-distended  Alert and oriented x3  Heredia draining Clear yellow urine      ASSESSMENT AND PLAN:  Microhematuria  with Hx right radical nephrectomy for RCC and Known Left RCC under active surveillance  No evidence of hydronephrosis  CKD  Continue heredia catheter until medically stable voiding trial before discharge  To have ERCP on Monday given finding of obstruction on MRI  Liver mass and obstruction at the maribell hepatis always possibility for metastatic renal cell carcinoma  Nephrology is following     We will continue to follow      Electronically signed by Elissa Hong MD on 6/30/2018 at 6:13 AM

## 2018-06-30 NOTE — PROGRESS NOTES
input(s): LDLCALCU  ABGs: No results found for: PHART, PO2ART, EXZ6ENR  INR:   Recent Labs      18   0440   INR  1.2       -----------------------------------------------------------------  RAD: Ct Abdomen Pelvis Wo Contrast Additional Contrast? None    Result Date: 2018  Patient MRN:  [id number] : 1935 Age: 81 years Gender: Male Order Date:  2018 4:15 PM TECHNIQUE/NUMBER OF IMAGES/COMPARISON/CLINICAL HISTORY: CT abdomen and pelvis Axial with sagittal and coronal reconstructions 421 images History is hematuria wound check in the right foot fall trauma injury. Had previous right-sided nephrectomy. Has a abdominal aortic aneurysm with intravascular repair/grafting. Had previous anterior abdominal wall hernia repair. Has bilateral hip prosthesis. Comparison is made with the study of 2016. FINDINGS: There are markedly abnormal findings in the maribell hepatis region with marked dilatation of the right and left intrahepatic biliary tree/radicles. The common bile duct is not dilated, measures 2 mm in the head of the pancreas. There is also marked abnormal appearance of the gallbladder which appears to be distended with ill-defined walls and ill-defined interface between the gallbladder and the liver parenchyma in the region of the gallbladder fossa. As observed on previous study multiple gallstones are present. The findings can be manifestation of a primary malignant infiltrative of the biliary tree at the level of the high maribell hepatis causing occlusion of the common hepatic duct at the confluence between the right and the left main biliary radicals and of the cystic duct versus primary malignancy of the gallbladder infiltrate in the high maribell hepatis region and causing obstructive jaundice. If additional information will be needed in clinical management prior to an interventional procedure such as ERCP, MRI study multiphase contrast can be in better advantage with MRCP.  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 The aneurysm measures approximately 5.3 x 6.7 cm in the transverse diameter, previously it measured 5.2 x 6.3 cm. The aneurysm appears to be slightly larger since the previous examination. This aneurysm is surrounding the proximal segments of the right and left iliac grafts. The aneurysm continues into the proximal segment of the left the common iliac artery measuring 5 x 6 cm which also appears to be slightly larger since the previous study when it measured 5 x 5.4 cm. As seen on the previous study there is a paramidline anterior abdominal herniation in between the atrophic right rectus abdominal muscle and the right transverse/oblique muscles. There are bowel segments within the hernia sac but there is no signs for incarceration or bowel obstruction. There is some free fluid accumulation in the right lower pelvic cavity and in the right upper quadrant around the liver and minimal around the spleen. There is some increased vascularity of the omental fat planes. These are somewhat borderline. There are also some stranding of the cutaneous fat planes and retroperitoneal fat planes and mesenteric fat planes which will be part of the anasarca condition. Cannot evaluate the pelvic floor cavity. Cannot evaluate the prostate gland. Patient has a Dunn catheter in the bladder. Cannot evaluate the bladder. No free intraperitoneal observed. Patient had previous right nephrectomy. No obstructive uropathy in the left kidney Presence of a 2 hyperdense complex lesions in the left kidney, also observed on the previous study of February 2016, the largest one has a smaller size. Small left-sided pleural effusion are seen with areas of atelectasis in the left base. Degenerative changes are seen throughout the thoracic spine and lumbar spine.      1. Signs for malignancy in the maribell hepatis/gallbladder fossa with Liver demonstrates evidence for biliary dilatation The proximal aorta and the proximal IVC appear   to be poorly seen secondary to bowel gas and body habitus. The right kidney appears absent The common duct is enlarged. The gallbladder wall appears to be thickened. Calculi are identified Sludge is identified. The left kidney  is abnormal demonstrates a mass measuring 2.4 x 2.2 x 2.1 cm The spleen is unremarkable. Doppler ultrasound demonstrates hepatic and portal vein to be patent No definite ascites is seen     Solid mass in the left kidney neoplasm cannot be excluded Dilated biliary ducts Cholelithiasis, I cannot exclude cholecystitis although the gallbladder appears to be completely filled with calculi     Xr Chest Portable    Result Date: 2018  Patient MRN: [id number] : 1935 Age:  81 years Gender: Male Order Date: 2018 4:15 PM Exam: XR CHEST PORTABLE Number of Views: 1 Indication:   Urinary tract infection. Fatigue. Fall. Wounds on bottom of both feet. Comparison: Chest radiograph 3/19/2011 Findings: There is a normal cardiomediastinal silhouette with central pulmonary vascular congestion. No acute fracture on this severely limited fracture evaluation. Moderately severe degenerative changes left glenohumeral joint. . No pneumothorax. 1. Central pulmonary vascular congestion suspected. 2. Moderately severe left glenohumeral osteoarthritis. 3. No definitive acute fracture on this severely limited fracture evaluation exam.    Us Dup Abd Pel Retro Scrot Complete    Result Date: 2018  Patient MRN:  [id number] : 1935 Age: 81 years Gender: Male Order Date:  2018 5:45 AM EXAM: US ABDOMEN COMPLETE, US DUP ABD PEL RETRO SCROT COMPLETE NUMBER OF IMAGES:  133 INDICATION:  concern for hepatobiliary malignancy. Please perform w doppler attn: polina venous, hepatic venous, and hepatic arterial flow COMPARISON: CT scan abdomen 2018 The pancreas appears unremarkable.  Liver demonstrates

## 2018-06-30 NOTE — PROGRESS NOTES
IRON:    Lab Results   Component Value Date    IRON 44 2018     Iron Saturation:  No components found for: PERCENTFE  FERRITIN:    Lab Results   Component Value Date    FERRITIN 426 2018       Mri Abdomen Wo Contrast Mrcp    Result Date: 2018  Patient MRN:  [id number] : 1935 Age: 81 years Gender: Male Order Date:  2018 9:00 AM EXAM: MRI ABDOMEN WO CONTRAST MRCP NUMBER OF IMAGES \ views:  092 INDICATION:  eval biliary tree for obstruction COMPARISON: None 13  Sequences are submitted Multiple sequence of the abdomen with sagittal and coronal MPR reconstructions were obtained from the top of the diaphragm to the bottom of the kidneys. The visualized portions of the abdomen reveal: The liver is abnormal. There is evidence for extensive dilatation of the ducts. There is mass effect which is very subtle at the level of maribell hepatis measuring proximally 5.8 x 4.5 cm. There are calculi seen in the gallbladder or may be a porcelain gallbladder present  . Clerance Riis The spleen is unremarkable. . The kidneys are there is severe right renal atrophy. There are multiple masses in the left kidney these appear unchanged  . The adrenals is  unremarkable. The pancreas is poorly seen. There is extensive motion no gross mass is seen. . There is a mild amount of ascites     Extensive intrahepatic biliary dilatation with what appears to be a very subtle mass at the level of maribell hepatis suspicious for Klatskin tumor Numerous calculi and calcification possibly representing a porcelain gallbladder. Cholangiocarcinoma could give this appearance. Bilateral effusions and mild ascites ALERT:  THIS IS AN ABNORMAL REPORT       Assessment:     Principal Problem:    Hypotension  Active Problems:  ? Liver mass -signs for malignancy in the maribell hepatis/gallbladder fossa with obstructive malignant jaundice (CT)  ? Jaundice, likely obstructive  ? Elevated tumor markers (CEA 59.8; CA 19-9 1373)  ? Elevated LFTs  ?  Loss of appetite  ? Unintentional weight loss  ? Anemia, hypochromic  ? Hematuria-Urology following  ? INDY on CKD-nephrology following  ? Hx Kidney CA with right nephrectomy      Plan:     ? ERCP to be completed Monday with Dr. Jailyn Aguilera. Procedure details for ERCP drawn in detail. Complications including but not limited to pancreatitis, perforation, bleeding or infection are discussed in great detail. Risks, benefits, and alternatives have been explained. Pt has understood the information and has agreed to proceed. ? Colace 100 mg daily   ? Monitor lft's, amylase, lipase, and cbc/diff  ? Defer co-morbidities to others  ? IV fluids per nephrology/pcp  ?  Continue to monitor    Discussed with Dr. Nia Rinaldi per Dr. Nura Koehler, NP-C 6/30/2018 8:34 AM For Dr. Jailyn Aguilera

## 2018-07-01 NOTE — PROGRESS NOTES
Progress Note  7/1/2018 4:16 PM  Subjective:   Admit Date: 6/26/2018  PCP: Yasmeen Bhatia PA-C  Interval History:     Diet: DIET GENERAL;  Dietary Nutrition Supplements: Clear Liquid Oral Supplement  Diet NPO, After Midnight Exceptions are: Sips with Meds    Data:   Scheduled Meds:   midodrine  5 mg Oral BID WC    docusate sodium  100 mg Oral Daily    furosemide  40 mg Oral BID    ampicillin-sulbactam  3 g Intravenous See Admin Instructions    mupirocin   Topical Daily    calcium-vitamin D  1 tablet Oral BID    vitamin D  5,000 Units Oral Daily    cycloSPORINE modified  100 mg Oral Nightly    allopurinol  300 mg Oral Daily    finasteride  5 mg Oral Daily    pravastatin  40 mg Oral Daily    sodium chloride flush  10 mL Intravenous 2 times per day     Continuous Infusions:  PRN Meds:sodium chloride flush, sodium chloride flush, magnesium hydroxide, ondansetron  I/O last 3 completed shifts: In: 600 [P.O.:600]  Out: 575 [Urine:575]  No intake/output data recorded. Intake/Output Summary (Last 24 hours) at 07/01/18 1616  Last data filed at 07/01/18 1428   Gross per 24 hour   Intake              480 ml   Output              575 ml   Net              -95 ml     CBC:   Recent Labs      06/29/18   0355  06/30/18   0440  07/01/18   0441   WBC  9.7  11.4  12.6*   HGB  10.4*  10.5*  10.3*   PLT  255  258  245     BMP:  Recent Labs      06/29/18   0355  06/30/18   0440  07/01/18   0441   NA  142  140  136   K  4.3  4.4  4.4   CL  111*  110*  105   CO2  18*  17*  16*   BUN  65*  64*  72*   CREATININE  3.1*  3.2*  3.6*   GLUCOSE  128*  123*  131*     Hepatic: Recent Labs      06/29/18   0355  06/30/18   0440  07/01/18   0441   AST  214*  199*  166*   ALT  97*  95*  86*   BILITOT  3.7*  4.7*  5.0*   ALKPHOS  866*  901*  865*     Troponin: No results for input(s): TROPONINI in the last 72 hours. BNP: No results for input(s): BNP in the last 72 hours.   Lipids: No results for input(s): CHOL, HDL in the last 72 MRCP. 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 The aneurysm measures approximately 5.3 x 6.7 cm in the transverse diameter, previously it measured 5.2 x 6.3 cm. The aneurysm appears to be slightly larger since the previous examination. This aneurysm is surrounding the proximal segments of the right and left iliac grafts. The aneurysm continues into the proximal segment of the left the common iliac artery measuring 5 x 6 cm which also appears to be slightly larger since the previous study when it measured 5 x 5.4 cm. As seen on the previous study there is a paramidline anterior abdominal herniation in between the atrophic right rectus abdominal muscle and the right transverse/oblique muscles. There are bowel segments within the hernia sac but there is no signs for incarceration or bowel obstruction. There is some free fluid accumulation in the right lower pelvic cavity and in the right upper quadrant around the liver and minimal around the spleen. There is some increased vascularity of the omental fat planes. These are somewhat borderline. There are also some stranding of the cutaneous fat planes and retroperitoneal fat planes and mesenteric fat planes which will be part of the anasarca condition. Cannot evaluate the pelvic floor cavity. Cannot evaluate the prostate gland. Patient has a Dunn catheter in the bladder. Cannot evaluate the bladder. No free intraperitoneal observed. Patient had previous right nephrectomy. No obstructive uropathy in the left kidney Presence of a 2 hyperdense complex lesions in the left kidney, also observed on the previous study of February 2016, the largest one has a smaller size. Small left-sided pleural effusion are seen with areas of atelectasis in the left base. Degenerative changes are seen throughout the thoracic spine and lumbar spine.      1. Signs for malignancy in the maribell hepatis/gallbladder reduction. Technique: Low-dose CT  acquisition technique included one of following options; 1 . Automated exposure control, 2. Adjustment of MA and or KV according to patient's size or 3. Use of iterative reconstruction. Moderate cerebral volume loss/atrophy. Hypodensities in the bilateral centrum semiovale and periventricular regions. No intraparenchymal hemorrhage. No extra-axial fluid collection or hematoma. Vascular calcifications within the bilateral internal carotid artery cavernous segments. . No subfalcine, transtentorial or tonsillar herniation or shift. Visualized portions of the calvarium, bony orbital walls, bilateral zygomatic arches, bilateral nasal bones, bony paranasal sinuses, visualized mandible, visualized skull base, and visualized mastoid air cells demonstrate no definitive evidence of acute fracture or lytic or blastic bony lesion. Please see CT cervical spine report for details regarding the upper cervical spine is in an incompletely visualized on this study. 1. No CT evidence of acute intracranial abnormality, as questioned. If there is further clinical concern, MRI of the brain would be recommended for more detailed evaluation. . 2. Moderate cerebral volume loss/atrophy. 3. Moderate white matter changes consistent with sequelae of small vessel ischemic disease. 4. Vascular calcifications within the bilateral internal carotid artery cavernous segments. . 5. Please see CT cervical spine report given the cervical spine is incompletely imaged on this exam.    Us Abdomen Complete    Result Date: 2018  Patient MRN:  [id number] : 1935 Age: 81 years Gender: Male Order Date:  2018 5:45 AM EXAM: US ABDOMEN COMPLETE, US DUP ABD PEL RETRO SCROT COMPLETE NUMBER OF IMAGES:  133 INDICATION:  concern for hepatobiliary malignancy. Please perform w doppler attn: polina venous, hepatic venous, and hepatic arterial flow COMPARISON: CT scan abdomen 2018 The pancreas appears unremarkable. Liver demonstrates evidence for biliary dilatation The proximal aorta and the proximal IVC appear   to be poorly seen secondary to bowel gas and body habitus. The right kidney appears absent The common duct is enlarged. The gallbladder wall appears to be thickened. Calculi are identified Sludge is identified. The left kidney  is abnormal demonstrates a mass measuring 2.4 x 2.2 x 2.1 cm The spleen is unremarkable. Doppler ultrasound demonstrates hepatic and portal vein to be patent No definite ascites is seen     Solid mass in the left kidney neoplasm cannot be excluded Dilated biliary ducts Cholelithiasis, I cannot exclude cholecystitis although the gallbladder appears to be completely filled with calculi     Xr Chest Portable    Result Date: 2018  Patient MRN: [id number] : 1935 Age:  81 years Gender: Male Order Date: 2018 4:15 PM Exam: XR CHEST PORTABLE Number of Views: 1 Indication:   Urinary tract infection. Fatigue. Fall. Wounds on bottom of both feet. Comparison: Chest radiograph 3/19/2011 Findings: There is a normal cardiomediastinal silhouette with central pulmonary vascular congestion. No acute fracture on this severely limited fracture evaluation. Moderately severe degenerative changes left glenohumeral joint. . No pneumothorax. 1. Central pulmonary vascular congestion suspected. 2. Moderately severe left glenohumeral osteoarthritis. 3. No definitive acute fracture on this severely limited fracture evaluation exam.    Us Dup Abd Pel Retro Scrot Complete    Result Date: 2018  Patient MRN:  [id number] : 1935 Age: 81 years Gender: Male Order Date:  2018 5:45 AM EXAM: US ABDOMEN COMPLETE, US DUP ABD PEL RETRO SCROT COMPLETE NUMBER OF IMAGES:  133 INDICATION:  concern for hepatobiliary malignancy. Please perform w doppler attn: polina venous, hepatic venous, and hepatic arterial flow COMPARISON: CT scan abdomen 2018 The pancreas appears unremarkable.  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 with probable underlying microvascular disease given the Hx of the AAA with the Iliac Artery Stents as well as 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 and a met Alkalosis as seen by the initial Delta AG:Delta HCO3=2-the AG most probably reflects the INDY-the alkalosis reflects the out pt torsemide-alkalosis resolved with the IVF and being off the loop diuretic and now when corrected for the hypoalbuminemia pt has an AGMA in the setting of the INDY-follow     4- Hypocalcemia in the setting of the INDY and the Hypoalbumin-low ionized Ca++ 1.11improving post  po supplement-controlled PO4, elevated  in the setting of the hypocalcemia and low  Vit D<5  On supplement     5- Anemia in CKD -- Ferritin 426 with an iron sat 27%, B12 and folate adequate , hold on WILIAM in the setting of the malignancies     6-Mass in the [place] hepatis-W/U in progress-for MRI with MRCP no contrast ordered with study-markedly elevated CA 19-9     7- Hypervolemia with the presence of the edema as well as the ascites and pulm vascular congestion-hold on diuresis for present with the INDY- CXR some pulm congestion clinically no s/o HF      8-Psoriasis currently on cyclosporine-await level in the setting of the renal failure     Renal functions somewhat worse , BP remains marginal po intake so so , will add IVF     Issues discussed , sister was a

## 2018-07-02 NOTE — PROGRESS NOTES
Dr Edvni Perea paged through answering service, message left with answering service that Dr Cici Maldonado is ordering an IR  cbd drain and liver biopsy

## 2018-07-02 NOTE — PROGRESS NOTES
Occupational Therapy  Patient treatment attempted this AM. Nursing working with pt. Getting prepared to go to a procedure. Will attempt at a later time.   [place] [facility] RALPH/L [id number]

## 2018-07-02 NOTE — PATIENT CARE CONFERENCE
[facility] Quality Flow/Interdisciplinary Rounds Progress Note        Quality Flow Rounds held on July 2, 2018    Disciplines Attending:  Bedside Nurse, ,  and Nursing Unit Leadership    Aubry Spurling was admitted on 6/26/2018  3:46 PM    Anticipated Discharge Date:  Expected Discharge Date: 07/02/18    Disposition:    Олег Score:  Олег Scale Score: 16    Readmission Risk              Risk of Unplanned Readmission:        19             Discussed patient goal for the day, patient clinical progression, and barriers to discharge.   The following Goal(s) of the Day/Commitment(s) have been identified:  Monitor labs, intake, and output, for ERCP, discharge planning    [employer]  July 2, 2018

## 2018-07-02 NOTE — PROGRESS NOTES
17*  16*  15*   BUN  64*  72*  78*   CREATININE  3.2*  3.6*  3.9*   GLUCOSE  123*  131*  123*   CALCIUM  7.4*  7.4*  7.5*       Recent Labs      06/30/18   0440  07/01/18   0441  07/02/18   0910   WBC  11.4  12.6*  13.9*   RBC  3.80  3.82  3.78*   HGB  10.5*  10.3*  10.2*   HCT  33.5*  33.7*  32.4*   MCV  88.2  88.2  85.7   MCH  27.6  27.0  27.0   MCHC  31.3*  30.6*  31.5*   RDW  18.6*  18.8*  19.5*   PLT  258  245  273   MPV  10.0  10.2  10.3       Assessment:    Principal Problem:    Hypotension  Active Problems:    Abdominal aortic aneurysm without rupture (HCC)    Essential hypertension    Kidney mass    Psoriatic arthritis (HCC)    Gross hematuria    Acute renal failure (HCC)    Troponin level elevated    Abnormal EKG    Acidosis    Moderate protein-calorie malnutrition (HCC)    Jaundice  Resolved Problems:    * No resolved hospital problems. *      Plan:  1. Hypotension: etiology unknown. Likely dehydration as the patient responded well to fluids. Continue holding antihypertensive medication.  Despite the fact that the patient did not complain of any shortness of breath or chest pain, the fact that his blood pressure dropped abruptly with him feeling weak and with the presence of troponin elevation and Q waves in the anterior leads, concern about the possibility of an MI- Unlikely MI per cardio. 2. Elevated troponin: might be type II MI due to hypertension and acute renal failure,troponin trending down. Cardiology consulted. Per cardio, abnormal enzyme pattern-doubt MI. Echo showing mild LVH with EF 60%. 3.  Acute renal failure CKD stage III, most likely due to hypotension, CT scan of the abdomen showed no signs of obstruction or hydronephrosis. The patient does have one kidney which has 2 hyperdensities representing known cancer. Appreciate nephrology input. Kidney function worsening- on IVF. Recheck bmp in am. Per nephro- patient would refuse HD if warranted.    4  HTN: holding antihypertensive

## 2018-07-02 NOTE — PLAN OF CARE
Problem: Falls - Risk of:  Goal: Will remain free from falls  Will remain free from falls   Outcome: Met This Shift    Goal: Absence of physical injury  Absence of physical injury   Outcome: Met This Shift      Problem: Skin Integrity:  Goal: Will show no infection signs and symptoms  Will show no infection signs and symptoms   Outcome: Met This Shift    Goal: Absence of new skin breakdown  Absence of new skin breakdown   Outcome: Met This Shift      Problem: Risk for Impaired Skin Integrity  Goal: Tissue integrity - skin and mucous membranes  Structural intactness and normal physiological function of skin and  mucous membranes.    Outcome: Met This Shift

## 2018-07-02 NOTE — PROGRESS NOTES
[facility] PROGRESS NOTE                                                                       7/2/2018        CHIEF UROLOGIC COMPLAINT: Renal cell carcinoma   HISTORY OF PRESENT ILLNESS:  Patient without new complaints. No pain. For endoscopy today with biopsy, possible stent. REVIEW OF SYSTEMS:   CONSTITUTIONAL: negative  HEENT: negative  HEMATOLOGIC: negative  ENDOCRINE: negative  RESPIRATORY: negative  CV: negative  GI: negative  NEURO: negative  ORTHOPEDICS: rheumatoid arthritis  PSYCHIATRIC: negative  : as above    PAST FAMILY HISTORY:    Family History   Problem Relation Age of Onset    High Blood Pressure Mother     Heart Disease Father      PAST SOCIAL HISTORY:    Social History     Social History    Marital status:       Spouse name: N/A    Number of children: N/A    Years of education: N/A     Social History Main Topics    Smoking status: Former Smoker     Packs/day: 1.50     Years: 20.00     Quit date: 12/15/1980    Smokeless tobacco: Never Used    Alcohol use Yes      Comment: very rare at best    Drug use: No    Sexual activity: No     Other Topics Concern    None     Social History Narrative    None       Scheduled Meds:   docusate sodium  100 mg Oral Daily    ampicillin-sulbactam  3 g Intravenous See Admin Instructions    mupirocin   Topical Daily    calcium-vitamin D  1 tablet Oral BID    vitamin D  5,000 Units Oral Daily    cycloSPORINE modified  100 mg Oral Nightly    allopurinol  300 mg Oral Daily    finasteride  5 mg Oral Daily    pravastatin  40 mg Oral Daily    sodium chloride flush  10 mL Intravenous 2 times per day     Continuous Infusions:   sodium chloride 60 mL/hr at 07/01/18 1707     PRN Meds:.sodium chloride flush, sodium chloride flush, magnesium hydroxide, ondansetron    BP (!) 90/50   Pulse 71   Temp 97.6 °F (36.4 °C) (Oral)

## 2018-07-02 NOTE — PROGRESS NOTES
Message left for Akila Zuñiga NP, tamia from gastro wants pt to have indocin  Supp.  Tamia wants ATB and indocin given at 1130am

## 2018-07-02 NOTE — PROGRESS NOTES
Physical Therapy    Facility/Department: [facility]    NAME: Jignesh Hairston  : 1935  MRN: [id number]    Date of Service: 2018    Attempted to see pt for PT session, hold secondary to pt going for ERCP  [facility] PT [phone]

## 2018-07-02 NOTE — FLOWSHEET NOTE
Re-visit for coccyx wound     07/02/18 1340   Wound 07/01/18 Coccyx   Date First Assessed/Time First Assessed: 07/01/18 2130   Location: Coccyx  Pre-existing: No  Photo Taken: No   Wound Image    Wound Length (cm) 2 cm   Wound Width (cm) 0.3 cm   Wound Depth (cm)  0.1   Calculated Wound Size (cm^2) (l*w) 0.6 cm^2   Change in Wound Size % (l*w) 60   Wound Assessment Pink;Red   Drainage Amount (no dressing one)   Odor None   Annabelle-wound Assessment Pink   slit at gluteal cleft. Comfort guide in place  Plan aquaphor  SANDRA Key, Wound Care

## 2018-07-02 NOTE — ANESTHESIA POSTPROCEDURE EVALUATION
Department of Anesthesiology  Postprocedure Note    Patient: Nayeli Lemus  MRN: [id number]  YOB: 1935  Date of evaluation: 7/2/2018  Time:  2:26 PM     Procedure Summary     Date:  07/02/18 Room / Location:  [facility] ENDOSCOPY    Anesthesia Start:  4064 Anesthesia Stop:  7102    Procedures:       ERCP DIAGNOSTIC (N/A )      ERCP Diagnosis:  Melissa Doherty)    Surgeon:  Linda Matute MD Responsible Provider:  Brendon Davila DO    Anesthesia Type:  MAC ASA Status:  4          Anesthesia Type: MAC    Nasim Phase I:      Nasim Phase II:      Last vitals: Reviewed and per EMR flowsheets.        Anesthesia Post Evaluation    Patient location during evaluation: bedside  Patient participation: complete - patient cannot participate  Level of consciousness: awake and alert  Airway patency: patent  Nausea & Vomiting: no nausea and no vomiting  Complications: no  Cardiovascular status: blood pressure returned to baseline  Respiratory status: acceptable  Hydration status: euvolemic

## 2018-07-02 NOTE — PROGRESS NOTES
[facility]  Nephrology Attending Progress Note  Chris Mo.  Tigre Mora MD        SUBJECTIVE:     7/2: for ercp today      PROBLEM LIST:    Patient Active Problem List   Diagnosis    Abdominal aortic aneurysm without rupture ([address]    Pure hypercholesterolemia    Essential hypertension    Chronic gout of multiple sites    Benign non-nodular prostatic hyperplasia with lower urinary tract symptoms    Post corneal transplant    Kidney mass    Psoriatic arthritis ([address]    Gross hematuria    Acute renal failure (HCC)    Hypotension    Hypotension    Troponin level elevated    Abnormal EKG    Acidosis    Moderate protein-calorie malnutrition (HCC)    Jaundice        PAST MEDICAL HISTORY:    Past Medical History:   Diagnosis Date    Abdominal aortic aneurysm without rupture ([address] 1/18/2016    Acute renal failure ([address] 6/26/2018    Cancer ([facility]     kidney    Gout     Hyperlipidemia     Hypertension     Osteoarthritis     Psoriasis        DIET:    Dietary Nutrition Supplements: Clear Liquid Oral Supplement  Diet NPO, After Midnight Exceptions are: Sips with Meds     PHYSICAL EXAM:     Patient Vitals for the past 24 hrs:   BP Temp Temp src Pulse Resp SpO2 Weight   07/02/18 0814 108/63 97.3 °F (36.3 °C) Oral 73 18 96 % -   07/02/18 0101 - - - - - - 250 lb (113.4 kg)   07/02/18 0004 (!) 90/50 97.6 °F (36.4 °C) Oral 71 16 96 % -   07/01/18 2130 (!) 91/52 97.7 °F (36.5 °C) Oral 73 18 97 % -   07/01/18 1437 (!) 92/52 97.4 °F (36.3 °C) Oral 74 18 99 % -   @      Intake/Output Summary (Last 24 hours) at 07/02/18 1111  Last data filed at 07/02/18 0315   Gross per 24 hour   Intake              807 ml   Output              350 ml   Net              457 ml         Wt Readings from Last 3 Encounters:   07/02/18 250 lb (113.4 kg)   10/27/17 262 lb (118.8 kg)   12/08/16 263 lb (119.3 kg)       Constitutional:  Pt is in no acute distress  Head: normocephalic, atraumatic  Neck: no JVD  Cardiovascular: regular rate and rhythm, no murmurs, gallops, or rubs  Respiratory:  No rales, rhochi, or wheezes  Gastrointestinal:  Soft, nontender, nondistended, bowel sounds x 4  Ext: no edema  Skin: dry, no rash  Neuro: aaox3    MEDS (scheduled):    [START ON 7/3/2018] indomethacin  100 mg Rectal 60 Min Pre-Op    docusate sodium  100 mg Oral Daily    ampicillin-sulbactam  3 g Intravenous See Admin Instructions    mupirocin   Topical Daily    calcium-vitamin D  1 tablet Oral BID    vitamin D  5,000 Units Oral Daily    cycloSPORINE modified  100 mg Oral Nightly    allopurinol  300 mg Oral Daily    finasteride  5 mg Oral Daily    pravastatin  40 mg Oral Daily    sodium chloride flush  10 mL Intravenous 2 times per day       MEDS (infusions):   sodium chloride 60 mL/hr at 07/02/18 0824       MEDS (prn):  sodium chloride flush, sodium chloride flush, magnesium hydroxide, ondansetron    DATA:    Recent Labs      06/30/18   0440  07/01/18   0441  07/02/18   0910   WBC  11.4  12.6*  13.9*   HGB  10.5*  10.3*  10.2*   HCT  33.5*  33.7*  32.4*   MCV  88.2  88.2  85.7   PLT  258  245  273     Recent Labs      06/30/18   0440  07/01/18   0441  07/02/18   0910   NA  140  136  136   K  4.4  4.4  4.8   CL  110*  105  106   CO2  17*  16*  15*   BUN  64*  72*  78*   CREATININE  3.2*  3.6*  3.9*   LABGLOM  19  16  15   GLUCOSE  123*  131*  123*   CALCIUM  7.4*  7.4*  7.5*   ALT  95*  86*  78*   AST  199*  166*  163*   BILIDIR  4.3*  4.6*  4.9*   BILITOT  4.7*  5.0*  5.4*   ALKPHOS  901*  865*  897*       Lab Results   Component Value Date    LABALBU 2.0 (L) 07/02/2018    LABALBU 2.1 (L) 07/01/2018    LABALBU 2.2 (L) 06/30/2018     Lab Results   Component Value Date    TSH 2.300 06/26/2018     No results found for: PHART, PO2ART, [id number]    Iron Studies:   Lab Results   Component Value Date    IRON 44 (L) 06/29/2018    TIBC 162 (L) 06/29/2018    FERRITIN 426 06/29/2018         IMPRESSION/RECOMMENDATIONS:      1-Stage II INDY due most probably to

## 2018-07-03 NOTE — PATIENT CARE CONFERENCE
[facility] Quality Flow/Interdisciplinary Rounds Progress Note        Quality Flow Rounds held on July 3, 2018    Disciplines Attending:  Bedside Nurse, ,  and Nursing Unit Leadership    Macie Burrows was admitted on 6/26/2018  3:46 PM    Anticipated Discharge Date:  Expected Discharge Date: 07/02/18    Disposition:    Олег Score:  Олег Scale Score: 16    Readmission Risk              Risk of Unplanned Readmission:        15             Discussed patient goal for the day, patient clinical progression, and barriers to discharge.   The following Goal(s) of the Day/Commitment(s) have been identified:  Monitor labs, intake, and output, for IR drain placement and liver biopsy, plan per surgery, discharge planning      Starr Quintero  July 3, 2018

## 2018-07-03 NOTE — CONSULTS
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 He presented after a fall and has had a 20lb weightloss over the past 6 months. He denies any abdominal pain was but was noticed to be jaundiced on admission. Because of his chronic CKD no contrast has been on any of his scans. His MRI however is most telling. His CEA is 60, Ca 19-9 is 1373, with a normal AFP.       Past Medical History:   Diagnosis Date    Abdominal [facility] aneurysm without rupture ([facility] 1/18/2016    Acute renal failure ([facility] 6/26/2018    Cancer (HCC)     kidney    Gout     Hyperlipidemia     Hypertension     Osteoarthritis     Psoriasis        Past Surgical History:   Procedure Laterality Date    ABDOMEN SURGERY  2015    mesh inserted    ABDOMINAL AORTIC ANEURYSM REPAIR, ENDOVASCULAR  01/25/2016    Excluder, Delatore    COLONOSCOPY      JOINT REPLACEMENT Bilateral 1980, 1981?    hips    KIDNEY REMOVAL Right 02/2010    OK ERCP DX COLLECTION SPECIMEN BRUSHING/WASHING N/A 7/2/2018    ERCP DIAGNOSTIC performed by Lani Pool MD at [facility]         Current Facility-Administered Medications   Medication Dose Route Frequency Provider Last Rate Last Dose    indomethacin (INDOCIN) 50 MG suppository 100 mg  100 mg Rectal 60 Min Pre-Op Giovanna A Sugar, APRN - CNP   100 mg at 07/02/18 1135    mineral oil-hydrophilic petrolatum (AQUAPHOR) ointment   Topical BID Fam Santos MD        0.9 % sodium chloride infusion   Intravenous Continuous Hasit George Oro MD 60 mL/hr at Social History Main Topics    Smoking status: Former Smoker     Packs/day: 1.50     Years: 20.00     Quit date: 12/15/1980    Smokeless tobacco: Never Used    Alcohol use Yes      Comment: very rare at best    Drug use: No    Sexual activity: No     Other Topics Concern    Not on file     Social History Narrative    No narrative on file       ROS:   Review of Systems   Constitutional: Positive for malaise/fatigue. Negative for chills, diaphoresis and fever. HENT: Negative for congestion, ear discharge, ear pain, hearing loss, nosebleeds and tinnitus. Eyes: Negative for double vision, photophobia, pain and discharge. Respiratory: Positive for sputum production and shortness of breath. Negative for hemoptysis. Cardiovascular: Negative for palpitations, orthopnea, claudication and leg swelling. Gastrointestinal: Negative for abdominal pain, blood in stool, constipation, diarrhea, heartburn, melena, nausea and vomiting. Genitourinary: Negative for frequency, hematuria and urgency. Musculoskeletal: Positive for myalgias. Negative for back pain, joint pain and neck pain. Skin: Negative for itching and rash. Neurological: Positive for weakness. Negative for tingling, tremors, sensory change, speech change, focal weakness and seizures. Endo/Heme/Allergies: Negative for environmental allergies and polydipsia. Psychiatric/Behavioral: Negative for hallucinations, substance abuse and suicidal ideas. The patient is not nervous/anxious and does not have insomnia. Physical Exam:  Vitals:    07/03/18 0906   BP: (!) 78/40   Pulse: 68   Resp: 18   Temp: 97.1 °F (36.2 °C)   SpO2: 96%       PSYCH: mood and affect normal, alert and oriented x 3  CONSTITUTIONAL: No apparent distress, comfortable  EYES: Sclera yellow, pupils equal round and reactive to light  ENMT:  Hearing normal, trachea midline, ears externally intact  LYMPH: no lympadenopathy in neck.  No lympadenopathy in groins  RESP: Breath

## 2018-07-03 NOTE — ANESTHESIA PRE PROCEDURE
Department of Anesthesiology  Preprocedure Note       Name:  Pardeep Linton   Age:  81 y.o.  :  1935                                          MRN:  [id number]         Date:  7/3/2018      Surgeon: Clementine Carlson  Procedure: LIVER BX AND ABDOMINAL DRAIN    Medications prior to admission:   Prior to Admission medications    Medication Sig Start Date End Date Taking? Authorizing Provider   nitrofurantoin (MACRODANTIN) 100 MG capsule Take 100 mg by mouth 2 times daily    Historical Provider, MD   allopurinol (ZYLOPRIM) 300 MG tablet Take 1 tablet by mouth daily 18   Porfirio Tejada, PA-C   atenolol (TENORMIN) 25 MG tablet Take 1 tablet by mouth 2 times daily 18   Porfirio Tejada, PA-C   finasteride (PROSCAR) 5 MG tablet Take 1 tablet by mouth daily Takes in the evening 18   Porfirio Tejada, PA-C   hydrALAZINE (APRESOLINE) 10 MG tablet Take 1 tablet by mouth daily 18   Porfirio Tejada, PA-C   pravastatin (PRAVACHOL) 40 MG tablet Take 1 tablet by mouth daily 18   Porfirio Tejada, PA-C   cycloSPORINE modified (NEORAL) 100 MG capsule Take 1 capsule by mouth daily Takes for arthritis and psoriasis 10/27/17   Porfirio Tejada, PA-C   torsemide (DEMADEX) 20 MG tablet Take 1 tablet by mouth every other day 10/27/17   Porfirio Tejada, PA-C       Current medications:    No current facility-administered medications for this visit. No current outpatient prescriptions on file.      Facility-Administered Medications Ordered in Other Visits   Medication Dose Route Frequency Provider Last Rate Last Dose    midodrine (PROAMATINE) tablet 5 mg  5 mg Oral TID  Dusty Pacheco MD   5 mg at 18 1025    indomethacin (INDOCIN) 50 MG suppository 100 mg  100 mg Rectal 60 Min Pre-Op Giovanna A Sugar, APRN - CNP   100 mg at 18 1135    mineral oil-hydrophilic petrolatum (AQUAPHOR) ointment   Topical BID Ivan Munoz MD        0.9 % sodium chloride infusion   Intravenous Continuous Hasit Kermit Morton MD 60 mL/hr at 18 1006      docusate sodium (COLACE) capsule 100 mg  100 mg Oral Daily Giovanna A Sugar, APRN - CNP   100 mg at 07/02/18 0950    ampicillin-sulbactam (UNASYN) 3 g ivpb minibag  3 g Intravenous See Admin Instructions Genevieve Martel, APRN - CNS   Stopped at 07/02/18 1621    mupirocin (BACTROBAN) 2 % cream   Topical Daily Saran Khan DPM        calcium-vitamin D (OSCAL-500) 500-200 MG-UNIT per tablet 1 tablet  1 tablet Oral BID Clay Brown MD   1 tablet at 07/03/18 5901    vitamin D tablet 5,000 Units  5,000 Units Oral Daily Clay Brown MD   5,000 Units at 07/03/18 0954    cycloSPORINE modified (NEORAL) capsule 100 mg  100 mg Oral Nightly Bo Brink MD   100 mg at 07/02/18 2000    sodium chloride flush 0.9 % injection 10 mL  10 mL Intravenous PRN Chriss Terrazas DO        allopurinol (ZYLOPRIM) tablet 300 mg  300 mg Oral Daily Bo Brink MD   300 mg at 07/03/18 0954    finasteride (PROSCAR) tablet 5 mg  5 mg Oral Daily Bo Brink MD   5 mg at 07/03/18 0954    pravastatin (PRAVACHOL) tablet 40 mg  40 mg Oral Daily Bo Brink MD   40 mg at 07/03/18 0954    sodium chloride flush 0.9 % injection 10 mL  10 mL Intravenous 2 times per day Bo Brink MD   10 mL at 07/02/18 0950    sodium chloride flush 0.9 % injection 10 mL  10 mL Intravenous PRN Bo Brink MD        magnesium hydroxide (MILK OF MAGNESIA) 400 MG/5ML suspension 30 mL  30 mL Oral Daily PRN Bo Brink MD        ondansetron [facility]) injection 4 mg  4 mg Intravenous Q6H PRN Bo Brink MD           Allergies: Allergies   Allergen Reactions    Latex     Codeine     Sulfa Antibiotics     Tape Tucker Sauger Tape]        Problem List:    Patient Active Problem List   Diagnosis Code    Abdominal aortic aneurysm without rupture (HCC) I71.4    Pure hypercholesterolemia E78.00    Essential hypertension I10    Chronic gout of multiple sites M1A. 84O9    Benign non-nodular prostatic hyperplasia with lower urinary tract symptoms N40.1    Post corneal transplant Z94.7    Kidney mass N28.89    Psoriatic arthritis (HCC) L40.50    Gross hematuria R31.0    Acute renal failure (HCC) N17.9    Hypotension I95.9    Hypotension I95.9    Troponin level elevated R74.8    Abnormal EKG R94.31    Acidosis E87.2    Moderate protein-calorie malnutrition (HCC) E44.0    Jaundice R17    Obstructive jaundice due to malignant neoplasm (HCC) K83.1, C80.1    Liver mass R16.0       Past Medical History:        Diagnosis Date    Abdominal aortic aneurysm without rupture ([facility] 1/18/2016    Acute renal failure ([facility] 6/26/2018    Cancer ([facility]     kidney    Gout     Hyperlipidemia     Hypertension     Osteoarthritis     Psoriasis        Past Surgical History:        Procedure Laterality Date    ABDOMEN SURGERY  2015    mesh inserted    ABDOMINAL AORTIC ANEURYSM REPAIR, ENDOVASCULAR  01/25/2016    Excluder, Delatore    COLONOSCOPY      JOINT REPLACEMENT Bilateral 1980, 1981?    hips    KIDNEY REMOVAL Right 02/2010    [place] ERCP DX COLLECTION SPECIMEN BRUSHING/WASHING N/A 7/2/2018    ERCP DIAGNOSTIC [facility] Ba[facility]    TONSILLECTOMY      TOTAL HIP ARTHROPLASTY         Social History:    Social History   Substance Use Topics    Smoking status: Former Smoker     Packs/day: 1.50     Years: 20.00     Quit date: 12/15/1980    Smokeless tobacco: Never Used    Alcohol use Yes      Comment: very rare at best                                Counseling given: Not Answered      Vital Signs (Current): There were no vitals filed for this visit.                                            BP Readings from Last 3 Encounters:   07/03/18 (!) 78/42   07/03/18 (!) 78/51   07/02/18 (!) 89/51       NPO Status:                           > 8hrs                                                      BMI:   Wt Readings from Last 3 Encounters:   07/03/18 249 lb 8 oz (113.2 kg)   10/27/17 262 lb (118.8 kg)   12/08/16 nephrectomy--renal cancer. Liver mass. Jaundice    Hematuria. .   Endo/Other:    (+) blood dyscrasia: anemia, arthritis:., malignancy/cancer (KIDNEY). Abdominal:   (+) obese,         Vascular:   + PVD, aortic or cerebral, . ROS comment: AAA--endovascular repair. Anesthesia Plan      MAC     ASA 4 - emergent     (Pt agrees to [facility] ATHENS and IV sedation. He is DNR-CC, but agrees to lift this code status in the perioperative period. Note: Latex allergy.)  Induction: intravenous. Anesthetic plan and risks discussed with patient and child/children.                       Caleb Perea MD   7/3/2018

## 2018-07-03 NOTE — PROGRESS NOTES
Message left with Dr Debbie Conroy answering service to notify of Dr Browning Brain order for IR to place CBD drain.

## 2018-07-03 NOTE — PROGRESS NOTES
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 The aneurysm measures approximately 5.3 x 6.7 cm in the transverse diameter, previously it measured 5.2 x 6.3 cm. The aneurysm appears to be slightly larger since the previous examination. This aneurysm is surrounding the proximal segments of the right and left iliac grafts. The aneurysm continues into the proximal segment of the left the common iliac artery measuring 5 x 6 cm which also appears to be slightly larger since the previous study when it measured 5 x 5.4 cm. As seen on the previous study there is a paramidline anterior abdominal herniation in between the atrophic right rectus abdominal muscle and the right transverse/oblique muscles. There are bowel segments within the hernia sac but there is no signs for incarceration or bowel obstruction. There is some free fluid accumulation in the right lower pelvic cavity and in the right upper quadrant around the liver and minimal around the spleen. There is some increased vascularity of the omental fat planes. These are somewhat borderline. There are also some stranding of the cutaneous fat planes and retroperitoneal fat planes and mesenteric fat planes which will be part of the anasarca condition. Cannot evaluate the pelvic floor cavity. Cannot evaluate the prostate gland. Patient has a Dunn catheter in the bladder. Cannot evaluate the bladder. No free intraperitoneal observed. Patient had previous right nephrectomy. No obstructive uropathy in the left kidney Presence of a 2 hyperdense complex lesions in the left kidney, also observed on the previous study of February 2016, the largest one has a smaller size. Small left-sided pleural effusion are seen with areas of atelectasis in the left base. Degenerative changes are seen throughout the thoracic spine and lumbar spine.      1. Signs for malignancy in the maribell hepatis/gallbladder fossa with obstructive malignant jaundice. See above comments, differential diagnosis, and recommendations. 2. Some infiltration of the omental fat planes could also be manifestation of a malignancy. 3. Mild ascites, could also be a manifestation of malignancy considering the findings in the liver maribell hepatis/gallbladder fossa. 4. Status post right nephrectomy. 5. Presence of 2 complex hyperdense cysts or lesions in the left kidney. No obstructive uropathy in the left kidney. 6. 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 ALERT:  THIS IS AN ABNORMAL REPORT     Xr Pelvis (1-2 Views)    Result Date: 2018  Patient MRN:  [id number] : 1935 Age: 81 years Gender: Male Order Date:  2018 4:15 PM EXAM: XR PELVIS (1-2 VIEWS) NUMBER OF IMAGES:  2 views INDICATION:  Fall, hypotensive Fall, hypotensive COMPARISON: None . A arthroplasty is identified of the the right left hip There is heterotopic bone formation. Alignment is near anatomic No foreign body is identified. Impresion: Post arthroplasty there appears to be near-anatomic alignment    Xr Ankle Left (2 Views)    Result Date: 2018  Patient MRN: [id number] : 1935 Age:  81 years Gender: Male Order Date: 2018 4:30 PM Exam: XR ANKLE LEFT (2 VIEWS) Number of Views: 2 Indication:  Urinary tract infection. Follow-up. Once on the bottom of the feet. Comparison: None. Findings: Severe soft tissue swelling. Ankle mortise alignment is preserved. Mild degenerative changes tibiotalar joint. Suspected large joint effusion. No acute fracture. No lytic or blastic bony lesion. ALERT:  THIS IS AN ABNORMAL REPORT 1. Severe soft tissue swelling. 2. Suspected large joint effusion. 3. Abnormal soft tissue density posterior to the tibiotalar joint and superior to the calcaneus of indeterminate etiology and significance. Correlation with CT scan recommended.  4. Mild intertarsal joints are also preserved. Non aggressive destructive bone process seen to indicate active osteomyelitis. Non aggressive periosteal reaction is seen. No focal osteolysis is observed. 1. Degenerative changes predominantly seen in the subtalar joints can be related with neuropathic joint. 2. Cannot conspicuously identify an area of an aggressive destructive bone process to indicate active osteomyelitis. Xr Foot Right (min 3 Views)    Result Date: 2018  Patient MRN:  [id number] : 1935 Age: 81 years Gender: Male Order Date:  2018 4:15 PM TECHNIQUE/NUMBER OF IMAGES/COMPARISON/CLINICAL HISTORY: Foot right frontal lateral and oblique projections 3 images, 3 views Osteomyelitis FINDINGS: There is a flat foot configuration with loss of the calcaneal angle in the lateral projection. Degenerative changes are seen in the subtalar joints and in the tarsal joints can be on the lateral part bases. Diffuse soft tissue swelling is seen in the distal right lower extremity into the left ankle and foot. Cannot see conspicuously an acute periosteal reaction or focal area of ostial lysis. There is an area of fibrosis in the region for the cuboid bone seen in the lateral view but difficult to be demonstrated in other projections. There is an area of lucency of the talus difficult to be seen in all views. 1. More likely neuropathic joint in the intertarsal joints. 2. can be additionally evaluated with other images modalities such as CT or better MRI study without and with IV contrast. 3. Diffuse soft tissue swelling in the distal right lower extremity. Ct Head Wo Contrast    Result Date: 2018  Patient MRN: [id number] : 1935 Age:  81 years Order Date: 2018 4:15 PM Gender: Male Exam: CT HEAD WO CONTRAST Number of Images: 150 Indication:   Inability to walk well at home, hematuria, fall last night Comparison: None. Findings:  This examination was performed on a CT scanner with dose reduction. Technique: Low-dose CT  acquisition technique included one of following options; 1 . Automated exposure control, 2. Adjustment of MA and or KV according to patient's size or 3. Use of iterative reconstruction. Moderate cerebral volume loss/atrophy. Hypodensities in the bilateral centrum semiovale and periventricular regions. No intraparenchymal hemorrhage. No extra-axial fluid collection or hematoma. Vascular calcifications within the bilateral internal carotid artery cavernous segments. . No subfalcine, transtentorial or tonsillar herniation or shift. Visualized portions of the calvarium, bony orbital walls, bilateral zygomatic arches, bilateral nasal bones, bony paranasal sinuses, visualized mandible, visualized skull base, and visualized mastoid air cells demonstrate no definitive evidence of acute fracture or lytic or blastic bony lesion. Please see CT cervical spine report for details regarding the upper cervical spine is in an incompletely visualized on this study. 1. No CT evidence of acute intracranial abnormality, as questioned. If there is further clinical concern, MRI of the brain would be recommended for more detailed evaluation. . 2. Moderate cerebral volume loss/atrophy. 3. Moderate white matter changes consistent with sequelae of small vessel ischemic disease. 4. Vascular calcifications within the bilateral internal carotid artery cavernous segments. . 5. Please see CT cervical spine report given the cervical spine is incompletely imaged on this exam.    Us Abdomen Complete    Result Date: 2018  Patient MRN:  [id number] : 1935 Age: 81 years Gender: Male Order Date:  2018 5:45 AM EXAM: US ABDOMEN COMPLETE, US DUP ABD PEL RETRO SCROT COMPLETE NUMBER OF IMAGES:  133 INDICATION:  concern for hepatobiliary malignancy. Please perform w doppler attn: polina venous, hepatic venous, and hepatic arterial flow COMPARISON: CT scan abdomen 2018 The pancreas appears unremarkable. 2018  Patient MRN: [id number] : 1935 Age:  81 years Gender: Male Order Date: 2018 4:15 PM Exam: XR CHEST PORTABLE Number of Views: 1 Indication:   Urinary tract infection. Fatigue. Fall. Wounds on bottom of both feet. Comparison: Chest radiograph 3/19/2011 Findings: There is a normal cardiomediastinal silhouette with central pulmonary vascular congestion. No acute fracture on this severely limited fracture evaluation. Moderately severe degenerative changes left glenohumeral joint. . No pneumothorax. 1. Central pulmonary vascular congestion suspected. 2. Moderately severe left glenohumeral osteoarthritis. 3. No definitive acute fracture on this severely limited fracture evaluation exam.    Mri Abdomen Wo Contrast Mrcp    Result Date: 2018  Patient MRN:  [id number] : 1935 Age: 81 years Gender: Male Order Date:  2018 9:00 AM EXAM: MRI ABDOMEN WO CONTRAST MRCP NUMBER OF IMAGES \ views:  728 INDICATION:  eval biliary tree for obstruction COMPARISON: None 13  Sequences are submitted Multiple sequence of the abdomen with sagittal and coronal MPR reconstructions were obtained from the top of the diaphragm to the bottom of the kidneys. The visualized portions of the abdomen reveal: The liver is abnormal. There is evidence for extensive dilatation of the ducts. There is mass effect which is very subtle at the level of maribell hepatis measuring proximally 5.8 x 4.5 cm. There are calculi seen in the gallbladder or may be a porcelain gallbladder present  . Clerance Riis The spleen is unremarkable. . The kidneys are there is severe right renal atrophy. There are multiple masses in the left kidney these appear unchanged  . The adrenals is  unremarkable. The pancreas is poorly seen. There is extensive motion no gross mass is seen.  . There is a mild amount of ascites     Extensive intrahepatic biliary dilatation with what appears to be a very subtle mass at the level of maribell hepatis suspicious for Klatskin tumor Numerous calculi and calcification possibly representing a porcelain gallbladder. Cholangiocarcinoma could give this appearance. Bilateral effusions and mild ascites ALERT:  THIS IS AN ABNORMAL REPORT     Us Dup Abd Pel Retro Scrot Complete    Result Date: 2018  Patient MRN:  [id number] : 1935 Age: 81 years Gender: Male Order Date:  2018 5:45 AM EXAM: US ABDOMEN COMPLETE, US DUP ABD PEL RETRO SCROT COMPLETE NUMBER OF IMAGES:  133 INDICATION:  concern for hepatobiliary malignancy. Please perform w doppler attn: polina venous, hepatic venous, and hepatic arterial flow COMPARISON: CT scan abdomen 2018 The pancreas appears unremarkable. Liver demonstrates evidence for biliary dilatation The proximal aorta and the proximal IVC appear   to be poorly seen secondary to bowel gas and body habitus. The right kidney appears absent The common duct is enlarged. The gallbladder wall appears to be thickened. Calculi are identified Sludge is identified. The left kidney  is abnormal demonstrates a mass measuring 2.4 x 2.2 x 2.1 cm The spleen is unremarkable. Doppler ultrasound demonstrates hepatic and portal vein to be patent No definite ascites is seen     Solid mass in the left kidney neoplasm cannot be excluded Dilated biliary ducts Cholelithiasis, I cannot exclude cholecystitis although the gallbladder appears to be completely filled with calculi     Assessment:     ? Liver mass -signs for malignancy in the maribell hepatis/gallbladder fossa with obstructive malignant jaundice (CT)  ? Jaundice, likely obstructive  ? Elevated tumor markers (CEA 59.8; CA 19-9 1373)  ? Elevated LFTs  ? Loss of appetite  ? Unintentional weight loss  ? Anemia, hypochromic  ? Right intrahepatic portal vein thrombosis noted on Dr Gene Fonseca note  ? Hematuria-Urology following  ? INDY on CKD-nephrology following  ? Hx Kidney CA with right nephrectomy-- path report--renal cell carcinoma, papillary type  ?  Left renal cell carcinoma currently under surveillance  ? ? Hilar cholangiocarcinoma  ? hypotension  ? S/P  Attempted ERCP by Dr Austen Mcintosh on 7/2/18 with procedure aborted secondary to hypotension and difficult sedation; only pancreatic duct was cannulated rior to procedure being aborted. Plan:     · IR has been consulted for PTC drain--internal/external and for liver biopsy--to be done at [facility]; await results  · Continue IV fluids as per nephrology  · Continue to monitor CBC, CMP, amylase, lipase, PT/INR daily    We will follow closely with you.     Discussed with Dr. Shala Rich developed by Dr. Cheryl Hand APRN-CNP 7/3/18 11:49 AM  for Dr. Austen Mcintosh

## 2018-07-03 NOTE — PROGRESS NOTES
abruptly with him feeling weak and with the presence of troponin elevation and Q waves in the anterior leads, concern about the possibility of an MI- Unlikely MI per cardio. 4.. Elevated troponin: might be type II MI due to hypertension and acute renal failure,troponin trending down. Cardiology consulted. Per cardio, abnormal enzyme pattern-doubt MI. Echo showing mild LVH with EF 60%. 5 HTN: holding antihypertensive medication due to hypotension. 6.  History of kidney cancer s/p right nephrectomy 7 years ago: patient reports a known cancer lesion on the left kidney that has been shrinking in size, CT scan of the abdomen obtained showing 2 hyperdensities in left kidney with questionable metastasis in the omental fat. 7.  Code status patient is DNR CCA, he does not want any intubation unless a surgical procedure is planned  8.  Bilateral feet calluses and ulcers: podiatry following. 9. CoNS Bacteremia: Likely a contaminant per ID. ID signed off case   10. Weakness: PT/OT AM-PAC 8/24  11. Labs timed out. Will order routine labs.      Dispo: For IR guided biopsy of liver and CBD drain placement today at [address] [place] [address].  Patient asking to update his son on plan.            Electronically signed by LYLY Kitchen on 7/3/2018 at 10:37 AM

## 2018-07-03 NOTE — CARE COORDINATION
Social Work/Discharge Planning:  Met with patient and his son Ed and discussed discharge options.  discussed snf for rehab versus long term care and briefly discussed hospice at home or at long term care facility. Patient and his son requested referral to [address] [place] [address] for information only. Provided patient and his son with information on private duty home care agencies and skilled nursing facility list.  Hospice order noted and referral made to [facility] from [address] [place] [address]. Will continue to follow.   Electronically signed by FERNY Tamayo on 7/3/2018 at 12:09 PM

## 2018-07-03 NOTE — PROGRESS NOTES
Spoke with Marlee Tam with Dr Poncho Fernandez regarding placing CBD drain. States will message Dr Poncho Fernandez and let us know.

## 2018-07-03 NOTE — PLAN OF CARE
Problem: Falls - Risk of:  Goal: Will remain free from falls  Will remain free from falls   Outcome: Met This Shift      Problem: Skin Integrity:  Goal: Absence of new skin breakdown  Absence of new skin breakdown   Outcome: Met This Shift      Problem: Risk for Impaired Skin Integrity  Goal: Tissue integrity - skin and mucous membranes  Structural intactness and normal physiological function of skin and  mucous membranes.    Outcome: Met This Shift

## 2018-07-03 NOTE — H&P (VIEW-ONLY)
dry, upper extremity bruise, left lower extremity with improved  Head: normocephalic and atraumatic  Eyes: pupils equal, round, and reactive to light, extraocular eye movements intact, conjunctivae normal  Neck: neck supple and non tender without mass   Pulmonary/Chest: clear to auscultation bilaterally- no wheezes, rales or rhonchi, normal air movement, no respiratory distress  Cardiovascular: normal rate, normal S1 and S2 and no carotid bruits  Abdomen: soft, non-tender, non-distended, normal bowel sounds, no masses or organomegaly  Extremities: No joint deformities bilateral hands and feet, pes planus bilaterally, bilateral feet callus soft mild serous discharge no pus no erythema and +1 edema  Neurologic: no cranial nerve deficit and speech normal        LABS:  Recent Labs      18   1620   NA  140   K  4.7   CL  102   CO2  20*   BUN  85*   CREATININE  4.2*   GLUCOSE  122*   CALCIUM  7.3*       Recent Labs      18   1620   WBC  8.2   RBC  4.03   HGB  10.7*   HCT  36.0*   MCV  89.3   MCH  26.6   MCHC  29.7*   RDW  17.0*   PLT  291   MPV  10.1       No results for input(s): POCGLU in the last 72 hours. Radiology: Ct Abdomen Pelvis Wo Contrast Additional Contrast? None    Result Date: 2018  Patient MRN:  [id number] : 1935 Age: 81 years Gender: Male Order Date:  2018 4:15 PM TECHNIQUE/NUMBER OF IMAGES/COMPARISON/CLINICAL HISTORY: CT abdomen and pelvis Axial with sagittal and coronal reconstructions 421 images History is hematuria wound check in the right foot fall trauma injury. Had previous right-sided nephrectomy. Has a abdominal aortic aneurysm with intravascular repair/grafting. Had previous anterior abdominal wall hernia repair. Has bilateral hip prosthesis. Comparison is made with the study of 2016. FINDINGS: There are markedly abnormal findings in the maribell hepatis region with marked dilatation of the right and left intrahepatic biliary tree/radicles.  The common bile duct is not dilated, measures 2 mm in the head of the pancreas. There is also marked abnormal appearance of the gallbladder which appears to be distended with ill-defined walls and ill-defined interface between the gallbladder and the liver parenchyma in the region of the gallbladder fossa. As observed on previous study multiple gallstones are present. The findings can be manifestation of a primary malignant infiltrative of the biliary tree at the level of the high maribell hepatis causing occlusion of the common hepatic duct at the confluence between the right and the left main biliary radicals and of the cystic duct versus primary malignancy of the gallbladder infiltrate in the high maribell hepatis region and causing obstructive jaundice. If additional information will be needed in clinical management prior to an interventional procedure such as ERCP, MRI study multiphase contrast can be in better advantage with MRCP. 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 The aneurysm measures approximately 5.3 x 6.7 cm in the transverse diameter, previously it measured 5.2 x 6.3 cm. The aneurysm appears to be slightly larger since the previous examination. This aneurysm is surrounding the proximal segments of the right and left iliac grafts. The aneurysm continues into the proximal segment of the left the common iliac artery measuring 5 x 6 cm which also appears to be slightly larger since the previous study when it measured 5 x 5.4 cm. As seen on the previous study there is a paramidline anterior abdominal herniation in between the atrophic right rectus abdominal muscle and the right transverse/oblique muscles. There are bowel segments within the hernia sac but there is no signs for incarceration or bowel obstruction.  There is some free fluid accumulation in the right lower pelvic cavity and in the right upper quadrant around the liver and minimal around the spleen. There is some increased vascularity of the omental fat planes. These are somewhat borderline. There are also some stranding of the cutaneous fat planes and retroperitoneal fat planes and mesenteric fat planes which will be part of the anasarca condition. Cannot evaluate the pelvic floor cavity. Cannot evaluate the prostate gland. Patient has a Dunn catheter in the bladder. Cannot evaluate the bladder. No free intraperitoneal observed. Patient had previous right nephrectomy. No obstructive uropathy in the left kidney Presence of a 2 hyperdense complex lesions in the left kidney, also observed on the previous study of 2016, the largest one has a smaller size. Small left-sided pleural effusion are seen with areas of atelectasis in the left base. Degenerative changes are seen throughout the thoracic spine and lumbar spine. 1. Signs for malignancy in the maribell hepatis/gallbladder fossa with obstructive malignant jaundice. See above comments, differential diagnosis, and recommendations. 2. Some infiltration of the omental fat planes could also be manifestation of a malignancy. 3. Mild ascites, could also be a manifestation of malignancy considering the findings in the liver maribell hepatis/gallbladder fossa. 4. Status post right nephrectomy. 5. Presence of 2 complex hyperdense cysts or lesions in the left kidney. No obstructive uropathy in the left kidney. 6. 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 ALERT:  THIS IS AN ABNORMAL REPORT     Xr Pelvis (1-2 Views)    Result Date: 2018  Patient MRN:  [id number] : 1935 Age: 81 years Gender: Male Order Date:  2018 4:15 PM EXAM: XR PELVIS (1-2 VIEWS) NUMBER OF IMAGES:  2 views INDICATION:  Fall, hypotensive Fall, hypotensive COMPARISON: None .  A arthroplasty is identified of the the right left hip There is heterotopic bone formation. Alignment is near anatomic No foreign body is identified. Impresion: Post arthroplasty there appears to be near-anatomic alignment    Xr Ankle Left (2 Views)    Result Date: 2018  Patient MRN: [id number] : 1935 Age:  81 years Gender: Male Order Date: 2018 4:30 PM Exam: XR ANKLE LEFT (2 VIEWS) Number of Views: 2 Indication:  Urinary tract infection. Follow-up. Once on the bottom of the feet. Comparison: None. Findings: Severe soft tissue swelling. Ankle mortise alignment is preserved. Mild degenerative changes tibiotalar joint. Suspected large joint effusion. No acute fracture. No lytic or blastic bony lesion. ALERT:  THIS IS AN ABNORMAL REPORT 1. Severe soft tissue swelling. 2. Suspected large joint effusion. 3. Abnormal soft tissue density posterior to the tibiotalar joint and superior to the calcaneus of indeterminate etiology and significance. Correlation with CT scan recommended. 4. Mild tibiotalar osteoarthritis. Xr Ankle Right (2 Views)    Result Date: 2018  Patient MRN: [id number] : 1935 Age:  81 years Gender: Male Order Date: 2018 4:30 PM Exam: XR ANKLE RIGHT (2 VIEWS) Number of Views: 2 Indication:  Wounds on the bottom of both feet. Urinary tract infection. Bowel. Comparison: None. Findings: Extensive severe soft tissue swelling about the ankle. Slight widening of the ankle mortise in the craniocaudal dimension laterally as compared to medially. Moderate degenerative changes at the talonavicular joint. Limited evaluation of the foot, findings are suggestive of a degree of mid foot collapse. ALERT:  THIS IS AN ABNORMAL REPORT 1. Findings are suggestive of a potential degree of mid foot collapse or possible talus head/neck fracture or potential fracture of tarsal bones. Further evaluation with CT of the ankle and foot are recommended. 2. Extensive, severe soft tissue swelling about the ankle.  3. Slight widening of the ankle mortise in the craniocaudal dimension, laterally as compared to medially, of indeterminate etiology and significance. 4. Moderate talonavicular osteoarthritis. Xr Foot Left (min 3 Views)    Result Date: 2018  Patient MRN:  [id number] : 1935 Age: 81 years Gender: Male Order Date:  2018 4:15 PM TECHNIQUE/NUMBER OF IMAGES/COMPARISON/CLINICAL HISTORY: X-ray series of the foot the left frontal lateral and oblique projections History osteomyelitis. 3 images, 3 views. FINDINGS: Generalized osteopenia is seen. There is diffuse mild soft tissue swelling in the distal left lower extremity through the ankle and throughout the dorsal aspect of the left foot. Degenerative changes are seen in the subtalar joints, can be part of neuropathic joint. The phalanxes are intact and as well as the interphalangeal joints. The metatarsal bones are intact and as well as the metatarsophalangeal joints. Tarsal metatarsal joints are preserved. The distal intertarsal joints are also preserved. Non aggressive destructive bone process seen to indicate active osteomyelitis. Non aggressive periosteal reaction is seen. No focal osteolysis is observed. 1. Degenerative changes predominantly seen in the subtalar joints can be related with neuropathic joint. 2. Cannot conspicuously identify an area of an aggressive destructive bone process to indicate active osteomyelitis. Xr Foot Right (min 3 Views)    Result Date: 2018  Patient MRN:  [id number] : 1935 Age: 81 years Gender: Male Order Date:  2018 4:15 PM TECHNIQUE/NUMBER OF IMAGES/COMPARISON/CLINICAL HISTORY: Foot right frontal lateral and oblique projections 3 images, 3 views Osteomyelitis FINDINGS: There is a flat foot configuration with loss of the calcaneal angle in the lateral projection. Degenerative changes are seen in the subtalar joints and in the tarsal joints can be on the lateral part bases.  Diffuse soft tissue swelling is seen in the distal right lower extremity into the left ankle and foot. Cannot see conspicuously an acute periosteal reaction or focal area of ostial lysis. There is an area of fibrosis in the region for the cuboid bone seen in the lateral view but difficult to be demonstrated in other projections. There is an area of lucency of the talus difficult to be seen in all views. 1. More likely neuropathic joint in the intertarsal joints. 2. can be additionally evaluated with other images modalities such as CT or better MRI study without and with IV contrast. 3. Diffuse soft tissue swelling in the distal right lower extremity. Ct Head Wo Contrast    Result Date: 2018  Patient MRN: [id number] : 1935 Age:  81 years Order Date: 2018 4:15 PM Gender: Male Exam: CT HEAD WO CONTRAST Number of Images: 150 Indication:   Inability to walk well at home, hematuria, fall last night Comparison: None. Findings: This examination was performed on a CT scanner with dose reduction. Technique: Low-dose CT  acquisition technique included one of following options; 1 . Automated exposure control, 2. Adjustment of MA and or KV according to patient's size or 3. Use of iterative reconstruction. Moderate cerebral volume loss/atrophy. Hypodensities in the bilateral centrum semiovale and periventricular regions. No intraparenchymal hemorrhage. No extra-axial fluid collection or hematoma. Vascular calcifications within the bilateral internal carotid artery cavernous segments. . No subfalcine, transtentorial or tonsillar herniation or shift. Visualized portions of the calvarium, bony orbital walls, bilateral zygomatic arches, bilateral nasal bones, bony paranasal sinuses, visualized mandible, visualized skull base, and visualized mastoid air cells demonstrate no definitive evidence of acute fracture or lytic or blastic bony lesion. Please see CT cervical spine report for details regarding the upper cervical spine is in an incompletely visualized on this study.      1. No CT evidence pressure dropped abruptly with him feeling weak and with the presence of troponin elevation and Q waves in the anterior leads on concerned about the possibility of an MI, I will trend troponin, consider consulting cardiology. 2.  Acute renal failure CK D stage III, most likely due to hypotension, CT scan of the abdomen showed no signs of obstruction or hydronephrosis, the patient does have one kidney which has 2 hyperdensities representing known cancer. I'll start the patient with IV fluids and monitor creatinine, I will consult nephrology. 3.  History of hematuria, might be related to kidney cancer, patient has a Dunn now, does not use a Dunn catheter at home. I'll consult urology. 4.  History of hypertension, holding antihypertensive medication due to hypotension. 5.  CT finding of questionable malignancy in the maribell hepatis/gallbladder fossa with obstructive malignant jaundice, with transaminitis alk phos 700, ALC in the 60s and  with mildly elevated bilirubin at 1.7, this might represent malignancy primary or secondary may be due to kidney cancer, I will consult general surgery for evaluation, might need for needle biopsy. 6.  History of kidney cancer status post right nephrectomy, patient reports an known cancer lesion on the left kidney that has been shrinking in size, CT scan of the abdomen obtained today showing 2 hyperdensities in left kidney with questionable metastasis in the omental fat. 7.  Code status patient is DNR CCA, he does not want any intubation unless a surgical procedure is planned  8. Elevated troponin, might be type II MI due to hypertension and acute renal failure, will trend troponin, consider consulting cardiology  9. Bilateral feet calluses and ulcers, will consult podiatry I don't think the patient needs antibiotics      DVT prophylaxis: SCDs      NOTE: This report was transcribed using voice recognition software.  Every effort was made to ensure accuracy; however,

## 2018-07-03 NOTE — ANESTHESIA PRE PROCEDURE
Department of Anesthesiology  Preprocedure Note       Name:  Heaven Marsh   Age:  81 y.o.  :  1935                                          MRN:  [id number]         Date:  7/3/2018      Surgeon: Gissell Em  Procedure: LIVER BX AND ABDOMINAL DRAIN    Medications prior to admission:   Prior to Admission medications    Medication Sig Start Date End Date Taking? Authorizing Provider   nitrofurantoin (MACRODANTIN) 100 MG capsule Take 100 mg by mouth 2 times daily    Historical Provider, MD   allopurinol (ZYLOPRIM) 300 MG tablet Take 1 tablet by mouth daily 18   Dalton Reno PA-C   atenolol (TENORMIN) 25 MG tablet Take 1 tablet by mouth 2 times daily 18   Dalton Reno PA-C   finasteride (PROSCAR) 5 MG tablet Take 1 tablet by mouth daily Takes in the evening 18   Dalton Reno PA-C   hydrALAZINE (APRESOLINE) 10 MG tablet Take 1 tablet by mouth daily 18   Dalton Reno PA-C   pravastatin (PRAVACHOL) 40 MG tablet Take 1 tablet by mouth daily 18   Dalton Reno PA-C   cycloSPORINE modified (NEORAL) 100 MG capsule Take 1 capsule by mouth daily Takes for arthritis and psoriasis 10/27/17   Dalton Reno PA-C   torsemide (DEMADEX) 20 MG tablet Take 1 tablet by mouth every other day 10/27/17   Dalton Reno PA-C       Current medications:    No current facility-administered medications for this visit. No current outpatient prescriptions on file.      Facility-Administered Medications Ordered in Other Visits   Medication Dose Route Frequency Provider Last Rate Last Dose    midodrine (PROAMATINE) tablet 5 mg  5 mg Oral TID  Albert Stern MD   5 mg at 18 1025    morphine injection 1 mg  1 mg Intravenous Q5 Min PRN Reginaldo Mota MD        morphine injection 2 mg  2 mg Intravenous Q5 Min PRN Reginaldo Mota MD        HYDROmorphone (DILAUDID) injection 0.25 mg  0.25 mg Intravenous Q5 Min PRN Reginaldo Mota MD        HYDROmorphone (DILAUDID) injection 0.5 mg  0.5 mg Oral Daily Red Little MD   40 mg at 07/03/18 0954    sodium chloride flush 0.9 % injection 10 mL  10 mL Intravenous 2 times per day Red Little MD   10 mL at 07/02/18 0950    sodium chloride flush 0.9 % injection 10 mL  10 mL Intravenous PRN Red Little MD        magnesium hydroxide (MILK OF MAGNESIA) 400 MG/5ML suspension 30 mL  30 mL Oral Daily PRN Red Little MD        ondansetron [facility] injection 4 mg  4 mg Intravenous Q6H PRN Red Little MD           Allergies: Allergies   Allergen Reactions    Latex     Codeine     Sulfa Antibiotics     Tape Gris Curt Tape]        Problem List:    Patient Active Problem List   Diagnosis Code    Abdominal aortic aneurysm without rupture (HCC) I71.4    Pure hypercholesterolemia E78.00    Essential hypertension I10    Chronic gout of multiple sites M1A. 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    Kidney mass N28.89    Psoriatic arthritis (HCC) L40.50    Gross hematuria R31.0    Acute renal failure (HCC) N17.9    Hypotension I95.9    Hypotension I95.9    Troponin level elevated R74.8    Abnormal EKG R94.31    Acidosis E87.2    Moderate protein-calorie malnutrition (HCC) E44.0    Jaundice R17    Obstructive jaundice due to malignant neoplasm (HCC) K83.1, C80.1    Liver mass R16.0       Past Medical History:        Diagnosis Date    Abdominal aortic aneurysm without rupture ([facility] [address] 1/18/2016    Acute renal failure ([address] 6/26/2018    Cancer ([facility] [address]     kidney    Gout     Hyperlipidemia     Hypertension     Osteoarthritis     Psoriasis        Past Surgical History:        Procedure Laterality Date    ABDOMEN SURGERY  2015    mesh inserted    ABDOMINAL AORTIC ANEURYSM REPAIR, ENDOVASCULAR  01/25/2016    Excluder, Delatore    COLONOSCOPY      JOINT REPLACEMENT Bilateral 1980, 1981?    hips    KIDNEY REMOVAL Right 02/2010    VT ERCP DX COLLECTION

## 2018-07-03 NOTE — PROGRESS NOTES
Spoke with Domingo Shah with Dr. Poornima Morales regarding placing drain. She advised to follow Dr. Susana Marquez orders. Informed by [facility] department that this drain placement can not be placed here at [facility]. Contacted [facility] IR department and left a message to contact the floor about transferring patient.

## 2018-07-03 NOTE — PROGRESS NOTES
Occupational Therapy  Patient treatment attempted this AM 2x. Patient receiving nursing care then later meeting with doctor in the room. Will attempt at a later time.   Lukas ROSAS/DEBBIE [id number]

## 2018-07-04 NOTE — PROGRESS NOTES
Spoke to [employer], patient will return within the hour. Drain placed with no biopsy, will do biopsy at later date. 8Fr tube, no flushing, set to gravity to drain.

## 2018-07-04 NOTE — PROGRESS NOTES
Hepatobiliary and Pancreatic Surgery Attending Progress Note    Dr Azul Vigil covering for Dr. 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 He appears weak but denies any pain. ROS: no cp    I reviewed the patient's Past Medical History, Past Surgical History, Medications, and Allergies.     LABS:  CBC:   Lab Results   Component Value Date    WBC 13.1 07/03/2018    RBC 3.74 07/03/2018    HGB 10.3 07/03/2018    HCT 32.9 07/03/2018    MCV 88.0 07/03/2018    MCH 27.5 07/03/2018    MCHC 31.3 07/03/2018    RDW 19.4 07/03/2018     07/03/2018    MPV 10.2 07/03/2018     CMP:    Lab Results   Component Value Date     07/03/2018    K 5.0 07/03/2018    K 4.4 06/27/2018     07/03/2018    CO2 16 07/03/2018    BUN 85 07/03/2018    CREATININE 4.4 07/03/2018    GFRAA 16 07/03/2018    LABGLOM 13 07/03/2018    GLUCOSE 112 07/03/2018    GLUCOSE 106 03/19/2011    PROT 4.6 07/03/2018    LABALBU 2.0 07/03/2018    LABALBU 2.6 03/17/2011    CALCIUM 7.5 07/03/2018    BILITOT 3.4 07/03/2018    ALKPHOS 801 07/03/2018     07/03/2018    ALT 66 07/03/2018     BMP:    Lab Results   Component Value Date     07/03/2018    K 5.0 07/03/2018    K 4.4 06/27/2018     07/03/2018    CO2 16 07/03/2018    BUN 85 07/03/2018    LABALBU 2.0 07/03/2018    LABALBU 2.6 03/17/2011    CREATININE 4.4 07/03/2018    CALCIUM 7.5 07/03/2018    GFRAA 16 07/03/2018    LABGLOM 13 07/03/2018     Hepatic Function Panel:    Lab Results   Component Value Date    ALKPHOS 406 07/03/2018    ALT 66 07/03/2018     07/03/2018    PROT 4.6 07/03/2018    BILITOT 3.4 07/03/2018    BILIDIR 2.8 07/03/2018    IBILI 0.6 07/03/2018    LABALBU 2.0 07/03/2018    LABALBU 2.6 03/17/2011     PT/INR:    Lab Results   Component Value Date    PROTIME 13.4 07/03/2018    PROTIME 18.0 03/19/2011    INR 1.2 07/03/2018     Warfarin PT/INR:  No components found for: PTPATWAR, PTINRWAR  ABG:  No results found for: TOX7CJL, BEART, H9LOEODZ, PHART, THGBART, ZAV4IFK, PO2ART, HNN5IHR    Physical Exam:  Vitals:    07/04/18 0428   BP:    Pulse:    Resp:    Temp: 95.5 °F (35.3 °C)   SpO2:        General appearance: alert, cooperative and in no acute distress. Lungs: decreased bilaterally  Heart: regular rate and rhythm  Abdomen: soft, non-tender; non distended, no masses,  no organomegaly; drain in place with bilious drainage  Extremities: symmetrical without clubbing cyanosis or edema.     Impression/Plan:      Assessment: Mayra Burnett is an 81 y.o. male who presents with intrahepatic ductal dilation with obstructive jaundice secondary to cancer, highly concerned for hilar cholangiocarcinoma, less likely metastatic; s/p PTC yesterday    Plan:  Continue PTC  follow up CBC, drain appears to be bilious    Electronically by Damián Ross MD, General Surgery  on 7/4/2018 at 8:21 AM

## 2018-07-04 NOTE — PROGRESS NOTES
Past Medical History:        Diagnosis Date    Abdominal aortic aneurysm without rupture ([facility] [address] 1/18/2016    Acute renal failure ([facility] [address] [facility] 6/26/2018    Cancer (HCC)     kidney    Gout     Hyperlipidemia     Hypertension     Osteoarthritis     Psoriasis      Past Surgical History:        Procedure Laterality Date    ABDOMEN SURGERY  2015    mesh inserted    ABDOMINAL AORTIC ANEURYSM REPAIR, ENDOVASCULAR  01/25/2016    Excluder, Ananth    COLONOSCOPY      JOINT REPLACEMENT Bilateral 1980, 1981?    hips    KIDNEY REMOVAL Right 02/2010    MI ERCP DX COLLECTION SPECIMEN BRUSHING/WASHING N/A 7/2/2018    ERCP DIAGNOSTIC performed by Martina Gonzales MD at [facility]         Allergies:  Latex; Codeine; Sulfa antibiotics; and Tape [adhesive tape]    Family Goal: comfort care    Meeting held with son Ed    Discharge Plan:  Discharge Disposition; undetermined at this time. Did discuss Transition Program at [facility] Name:       Isabella Alfred was consulted and medical record was reviewed. Ed was admitted to [facility] from home with dizziness and hypotension. Ed has history of kidney cancer with right nephrectomy 7 years ago. Found to have liver mass. The hospice benefit and philosophy were explained to Ed and patient. The following levels of care were discussed including, routine level of care at private home or facility, which hospice is not responsible for any room and board fees, and GIP level of care at the hospice house for short term symptom management. Once symptoms become managed the patient would need to be moved to a lower level of care. The discharge plan would be home. Family informed that  hospice team consists of the RNs who visit 1-3 times a week, the  who visits within the first 5 days of the hospice election, the Personal Care team who visit 1-3 times a week for personal care,  non medical Volunteers, and Chaplains. Also discussed the Transition Program at the [facility]. Notified Ed that this program will enable a 14 day stay at the [facility]. This program requires private pay of 300.00/day up front. Ed states he is interested in pursuing this option. His sister is coming in from [facility] [address] this evening and would like to further discuss discharge plan with his family. I told Ed that I am working the rest of the week and will follow up with him tomorrow. I left a hospice brochure with him. Asked him to contact me if he has any further questions that need answered. Emotional support provided through active listening. Notified charge nurse of above. Hospice of the [address] [place] [address]:  1. Patient is not presently under the care of Isabella Alfred  2. Will make a follow up visit tomorrow to further discuss possible plan of Transition Program at the [facility]. 3.Please contact [phone] with any questions.

## 2018-07-04 NOTE — PROGRESS NOTES
the abdomen showed no signs of obstruction or hydronephrosis. The patient does have one kidney which has 2 hyperdensities representing known cancer.  Appreciate nephrology input. Kidney function worsening- on IVF. Per nephro- patient would refuse HD if warranted. Now with no urine output. 3. Hypotension: etiology unknown. Likely dehydration as the patient responded well to fluids. Continue holding antihypertensive medication.  Despite the fact that the patient did not complain of any shortness of breath or chest pain, the fact that his blood pressure dropped abruptly with him feeling weak and with the presence of troponin elevation and Q waves in the anterior leads, concern about the possibility of an MI- Unlikely MI per cardio. Midodrine increased. S/P IVF bolus with no improvement in BP.   4. Elevated troponin: might be type II MI due to hypertension and acute renal failure,troponin trending down. Cardiology consulted. Per cardio, abnormal enzyme pattern-doubt MI. Echo showing mild LVH with EF 60%. 5 HTN: holding antihypertensive medication due to hypotension. 6.  History of kidney cancer s/p right nephrectomy 7 years ago: patient reports a known cancer lesion on the left kidney that has been shrinking in size, CT scan of the abdomen obtained showing 2 hyperdensities in left kidney with questionable metastasis in the omental fat. 7.  Code status patient is DNR CCA, he does not want any intubation unless a surgical procedure is planned  8.  Bilateral feet calluses and ulcers: podiatry following. 9. CoNS Bacteremia: Likely a contaminant per ID. ID signed off case   10. Weakness: PT/OT AM-PAC 8/24    Dispo: Patient is declining rapidly. Discussed this with the son who is present. Will continue to treat as patient is a DNR-CCA unless told otherwise. Patient's son to discuss his father's condition with his sister this afternoon. They are considering starting Hospice care today.        Electronically signed by

## 2018-07-04 NOTE — PROGRESS NOTES
Progress Note  7/4/2018 4:13 PM  Subjective:   Admit Date: 6/26/2018  PCP: Sourav Hong PA-C  Interval History:     Diet: Diet NPO, After Midnight Exceptions are: Sips with Meds  Dietary Nutrition Supplements: Clear Liquid Oral Supplement    Data:   Scheduled Meds:   midodrine  10 mg Oral TID WC    indomethacin  100 mg Rectal 60 Min Pre-Op    mineral oil-hydrophilic petrolatum   Topical BID    docusate sodium  100 mg Oral Daily    ampicillin-sulbactam  3 g Intravenous See Admin Instructions    mupirocin   Topical Daily    calcium-vitamin D  1 tablet Oral BID    vitamin D  5,000 Units Oral Daily    cycloSPORINE modified  100 mg Oral Nightly    allopurinol  300 mg Oral Daily    finasteride  5 mg Oral Daily    pravastatin  40 mg Oral Daily    sodium chloride flush  10 mL Intravenous 2 times per day     Continuous Infusions:   sodium chloride 60 mL/hr at 07/04/18 1343     PRN Meds:morphine, morphine, HYDROmorphone, HYDROmorphone, promethazine, meperidine, sodium chloride flush, sodium chloride flush, magnesium hydroxide, ondansetron  I/O last 3 completed shifts:   In: 1000 [I.V.:1000]  Out: 850 [Urine:100; Emesis/NG output:750]  I/O this shift:  In: -   Out: 150 [Emesis/NG output:150]    Intake/Output Summary (Last 24 hours) at 07/04/18 1613  Last data filed at 07/04/18 1522   Gross per 24 hour   Intake             1000 ml   Output             1000 ml   Net                0 ml     CBC:   Recent Labs      07/02/18   0910  07/03/18   1050  07/04/18   0910   WBC  13.9*  13.1*  14.8*   HGB  10.2*  10.3*  10.7*   PLT  273  256  284     BMP:  Recent Labs      07/02/18   0910  07/03/18   1050  07/04/18   0910   NA  136  137  135   K  4.8  5.0  5.4*   CL  106  106  103   CO2  15*  16*  14*   BUN  78*  85*  87*   CREATININE  3.9*  4.4*  4.7*   GLUCOSE  123*  112*  110*     Hepatic: Recent Labs      07/02/18   0910  07/03/18   1050  07/04/18   0910   AST  163*  113*  106*   ALT  78*  66*  56*   BILITOT  5.4*  3.4* 2.5*   ALKPHOS  897*  801*  739*     Troponin: No results for input(s): TROPONINI in the last 72 hours. BNP: No results for input(s): BNP in the last 72 hours. Lipids: No results for input(s): CHOL, HDL in the last 72 hours. Invalid input(s): LDLCALCU  ABGs: No results found for: PHART, PO2ART, BDY1DSD  INR:   Recent Labs      07/03/18   1050   INR  1.2       -----------------------------------------------------------------  RAD: No results found. Objective:   Vitals: BP (!) 72/42   Pulse 78   Temp 95.5 °F (35.3 °C) (Temporal)   Resp 16   Ht 6' (1.829 m)   Wt 254 lb 3.2 oz (115.3 kg)   SpO2 95%   BMI 34.48 kg/m²   General appearance: appears stated age   Skin:  No rashes or lesions  HEENT: Head: Normocephalic, no lesions, without obvious abnormality.   Neck: no adenopathy, no carotid bruit, no JVD, supple, symmetrical, trachea midline and thyroid not enlarged, symmetric, no tenderness/mass/nodules  Lungs: diminished breath sounds bibasilar  Heart: regular rate and rhythm, S1, S2 normal, no murmur, click, rub or gallop  Abdomen: soft, non-tender; bowel sounds normal; no masses,  no organomegaly  Extremities: edema +  Neurologic: Mental status: Alert, oriented, thought content appropriate    Assessment:   Patient Active Problem List:     Abdominal aortic aneurysm without rupture ([facility] [address]     Pure hypercholesterolemia     Essential hypertension     Chronic gout of multiple sites     Benign non-nodular prostatic hyperplasia with lower urinary tract symptoms     Post corneal transplant     Kidney mass     Psoriatic arthritis ([address]     Gross hematuria     Acute renal failure (HCC)     Hypotension     Hypotension     Troponin level elevated     Abnormal EKG     Acidosis     Moderate protein-calorie malnutrition (HCC)     Jaundice     Obstructive jaundice due to malignant neoplasm (HCC)     Liver mass    Plan:   1-Stage II INDY due most probably to decreased effective renal perfusion in the setting of the hypotension as well as the torsemide-urine NA is 42 , U urea 585 ( low )  , renal function worse , low urine output , BP marginal   Will restart IVF , hold diuretics for now      2-CKD G3B with a baseline serum cr 1.9-2.1mg/dl with an e-GFR+30-34ml/min in the setting of being S/P R Nephrectomy for Renal cell carcinoma, papillary type, Furhman's nuclear grade 2 in Feb 2011 with probable underlying microvascular disease given the Hx of the AAA with the Iliac Artery Stents as well as 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 and a met Alkalosis as seen by the initial Delta AG:Delta HCO3=2-the AG most probably reflects the INDY-the alkalosis reflects the out pt torsemide-alkalosis resolved with the IVF and being off the loop diuretic and now when corrected for the hypoalbuminemia pt has an AGMA in the setting of the INDY-follow     4- Hypocalcemia in the setting of the INDY and the Hypoalbumin-low ionized Ca++ 1.11improving post  po supplement-controlled PO4, elevated  in the setting of the hypocalcemia and low  Vit D<5  On supplement     5- Anemia in CKD -- Ferritin 426 with an iron sat 27%, B12 and folate adequate , hold on WILIAM in the setting of the malignancies     6-Mass in the [place] hepatis-W/U in progress, markedly elevated CA 19-9     7- Hypervolemia with the presence of the edema as well as the ascites and pulm vascular congestion-hold on diuresis for present with the INDY- CXR some pulm congestion clinically no s/o HF      8-Psoriasis currently on cyclosporine-await level in the setting of the renal failure     Issues discussed , sister was a dialysis patient , he does not want dialysis in any case   Has clearly mentioned in his living will -- NO dialysis     Family and patient considering Hospice care comfort measures , Code status changed to DNR CC             Miri Schmitz

## 2018-07-04 NOTE — PLAN OF CARE
Problem: Falls - Risk of:  Goal: Will remain free from falls  Will remain free from falls   Outcome: Met This Shift    Goal: Absence of physical injury  Absence of physical injury   Outcome: Met This Shift      Problem: Skin Integrity:  Goal: Will show no infection signs and symptoms  Will show no infection signs and symptoms   Outcome: Met This Shift    Goal: Absence of new skin breakdown  Absence of new skin breakdown   Outcome: Met This Shift

## 2018-07-04 NOTE — PROGRESS NOTES
PROGRESS NOTE    Patient Presents with/Seen in Consultation For      *biliary evlauation in the setting of liver mass, elevated CEA CA 19-9, increasing total and direct bilirubin, likely ERCP  CHIEF COMPLAINT:  hematuria, wound check of his feet bilaterally, and evaluation for fall   Subjective:     Patient seen laying in bed, in NAD. Family present at beside. Reports he tolerated clear liquids for breakfast without any difficulty. PTC drain placed yesterday, awaiting liver biopsy. Son reports \"not sure if we want to have that done\", emotional support offered. PTC drain site has seemed to stop oozing. drsg intact. Review of Systems  Aside from what was mentioned in the PMH and HPI, essentially unremarkable, all others negative. Objective:     Vitals: BP (!) 70/40   Pulse 70   Temp 95 °F (35 °C) (Temporal)   Resp 16   Ht 6' (1.829 m)   Wt 254 lb 3.2 oz (115.3 kg)   SpO2 95%   BMI 34.48 kg/m²     General appearance: alert, awake, laying in bed, and cooperative  Eyes: sclera icterus noted; conjunctivae/corneas clear. PERRL. Lungs: clear to auscultation bilaterally  Heart: regular rate and rhythm, no murmur, 2+ pulses; without edema  Abdomen: soft, non-tender; bowel sounds normal; no masses,  no organomegaly; heredia patent with scant  yellow urine seen. PTC drain- inact.  bilious drainage noted in bag  Extremities: trace BLE  Pulses: 2+ and symmetric  Skin: Skin color jaundiced, texture and turgor normal.   Neurologic: alert and oriented X 3      midodrine (PROAMATINE) tablet 10 mg TID WC   morphine injection 1 mg Q5 Min PRN   morphine injection 2 mg Q5 Min PRN   HYDROmorphone (DILAUDID) injection 0.25 mg Q5 Min PRN   HYDROmorphone (DILAUDID) injection 0.5 mg Q5 Min PRN   promethazine (PHENERGAN) injection 6.25 mg Q10 Min PRN   meperidine (DEMEROL) injection 12.5 mg Q5 Min PRN   indomethacin (INDOCIN) 50 MG suppository 100 mg 60 Min Pre-Op   mineral oil-hydrophilic petrolatum (AQUAPHOR) ointment BID   0.9 % sodium chloride infusion Continuous   docusate sodium (COLACE) capsule 100 mg Daily   ampicillin-sulbactam (UNASYN) 3 g ivpb minibag See Admin Instructions   mupirocin (BACTROBAN) 2 % cream Daily   calcium-vitamin D (OSCAL-500) 500-200 MG-UNIT per tablet 1 tablet BID   vitamin D tablet 5,000 Units Daily   cycloSPORINE modified (NEORAL) capsule 100 mg Nightly   sodium chloride flush 0.9 % injection 10 mL PRN   allopurinol (ZYLOPRIM) tablet 300 mg Daily   finasteride (PROSCAR) tablet 5 mg Daily   pravastatin (PRAVACHOL) tablet 40 mg Daily   sodium chloride flush 0.9 % injection 10 mL 2 times per day   sodium chloride flush 0.9 % injection 10 mL PRN   magnesium hydroxide (MILK OF MAGNESIA) 400 MG/5ML suspension 30 mL Daily PRN   ondansetron (ZOFRAN) injection 4 mg Q6H PRN        Data Review  Recent Labs      07/02/18   0910  07/03/18   1050   NA  136  137   K  4.8  5.0   CL  106  106   CO2  15*  16*   BUN  78*  85*   CREATININE  3.9*  4.4*   GLUCOSE  123*  112*   CALCIUM  7.5*  7.5*   ALKPHOS  897*  801*   ALT  78*  66*   AST  163*  113*   PROT  4.6*  4.6*   AMYLASE  33  45   LIPASE  54  91*   BILIDIR  4.9*  2.8*   IBILI  0.5  0.6   LABALBU  2.0*  2.0*       Recent Labs      07/02/18   0910  07/03/18   1050   WBC  13.9*  13.1*   RBC  3.78*  3.74*   HGB  10.2*  10.3*   HCT  32.4*  32.9*   MCV  85.7  88.0   MCH  27.0  27.5   MCHC  31.5*  31.3*   RDW  19.5*  19.4*   PLT  273  256   MPV  10.3  10.2       No components found for: CHLPL    Lab Results   Component Value Date    TRIG 141 10/27/2017    TRIG 164 (H) 12/08/2016    TRIG 138 07/20/2016       Lab Results   Component Value Date    HDL 40 10/27/2017    HDL 37 12/08/2016    HDL 39 07/20/2016       Lab Results   Component Value Date    LDLCALC 103 (H) 10/27/2017    LDLCALC 94 12/08/2016    LDLCALC 95 07/20/2016       Lab Results   Component Value Date    LABVLDL 28 10/27/2017    LABVLDL 33 12/08/2016    LABVLDL 28 07/20/2016      PT/INR:    Lab Results   Component Value

## 2018-07-05 NOTE — DISCHARGE SUMMARY
[facility] Physician Discharge Summary       [address]  [address]  [place] [id number]  [phone]            Dispo: hospice house    Patient ID:  Elio Hidalgo  [id number]  69 y.o.  1935    Admit date: 6/26/2018    Discharge date and time:  7/5/2018  11:52 AM    Admission Diagnoses: Principal Problem:    Hypotension  Active Problems:    Abdominal aortic aneurysm without rupture [facility])    Essential hypertension    Kidney mass    Psoriatic arthritis ([address]    Gross hematuria    Acute renal failure (HCC)    Troponin level elevated    Abnormal EKG    Acidosis    Moderate protein-calorie malnutrition (HCC)    Jaundice    Obstructive jaundice due to malignant neoplasm [facility])    Liver mass  Resolved Problems:    * No resolved hospital problems. *      Discharge Diagnoses: Principal Problem:    Hypotension  Active Problems:    Abdominal aortic aneurysm without rupture (HCC)    Essential hypertension    Kidney mass    Psoriatic arthritis (HCC)    Gross hematuria    Acute renal failure (HCC)    Troponin level elevated    Abnormal EKG    Acidosis    Moderate protein-calorie malnutrition (HCC)    Jaundice    Obstructive jaundice due to malignant neoplasm (HCC)    Liver mass  Resolved Problems:    * No resolved hospital problems.  *      Consults:  IP CONSULT TO NEPHROLOGY  IP CONSULT TO UROLOGY  IP CONSULT TO GENERAL SURGERY  IP CONSULT TO PODIATRY  IP CONSULT TO DIETITIAN  IP CONSULT TO NEPHROLOGY  IP CONSULT TO CARDIOLOGY  IP CONSULT TO INFECTIOUS DISEASES  IP CONSULT TO GI  IP CONSULT TO IV TEAM  IP CONSULT TO IV TEAM  IP CONSULT TO HOSPICE  IP CONSULT TO IV TEAM    Procedures: [facility]     Hospital Course:   81-year-old male with history of kidney cancer status post right nephrectomy 7 years ago also with left kidney cancer that has been followed for the last 7 years, patient presented as he felt suddenly very weak and that his legs were not able to hold him, found S2 and no carotid bruits  Abdomen: soft, non-tender,   Extremities: no cyanosis, no clubbing and +2-3 edema, Bilateral feet calluses, no signs of infection  Neurologic: no cranial nerve deficit and speech normal    I/O last 3 completed shifts:  In: -   Out: 2410 [Urine:60; Emesis/NG output:2350]  I/O this shift:  In: -   Out: 575 [Emesis/NG output:575]      LABS:  Recent Labs      07/03/18   1050  07/04/18   0910   NA  137  135   K  5.0  5.4*   CL  106  103   CO2  16*  14*   BUN  85*  87*   CREATININE  4.4*  4.7*   GLUCOSE  112*  110*   CALCIUM  7.5*  7.5*       Recent Labs      07/03/18   1050  07/04/18   0910   WBC  13.1*  14.8*   RBC  3.74*  3.96   HGB  10.3*  10.7*   HCT  32.9*  34.9*   MCV  88.0  88.1   MCH  27.5  27.0   MCHC  31.3*  30.7*   RDW  19.4*  20.2*   PLT  256  284   MPV  10.2  10.3       No results for input(s): POCGLU in the last 72 hours. Imaging:  No results found.     Patient Instructions:   Current Discharge Medication List      STOP taking these medications       nitrofurantoin (MACRODANTIN) 100 MG capsule Comments:   Reason for Stopping:         allopurinol (ZYLOPRIM) 300 MG tablet Comments:   Reason for Stopping:         atenolol (TENORMIN) 25 MG tablet Comments:   Reason for Stopping:         finasteride (PROSCAR) 5 MG tablet Comments:   Reason for Stopping:         hydrALAZINE (APRESOLINE) 10 MG tablet Comments:   Reason for Stopping:         pravastatin (PRAVACHOL) 40 MG tablet Comments:   Reason for Stopping:         cycloSPORINE modified (NEORAL) 100 MG capsule Comments:   Reason for Stopping:         torsemide (DEMADEX) 20 MG tablet Comments:   Reason for Stopping:                 Note that more than 30 minutes was spent in preparing discharge papers, discussing discharge with patient, medication review, etc.    Signed:  Electronically signed by Casimiro Morse MD on 7/5/2018 at 11:52 AM

## 2018-07-05 NOTE — PROGRESS NOTES
Daughter, Anni Schwartz, was notified of patient's condition via phone. I spoke with her regarding status for 10 minutes. No questions at this time.  Son was at bedside

## 2018-07-05 NOTE — PROGRESS NOTES
7/5/2018 7:59 AM  Service: Urology  Group: [facility] (Angus/Alejandro/Lindsay)    Rashard Molina  [id number]    Subjective:  Pt minimally responsive  Briefly opens eyes  Respirations slow  Unable to obtain bp  Family called to bedside    Review of Systems  Unable to obtain    Scheduled Meds:   midodrine  10 mg Oral TID WC    indomethacin  100 mg Rectal 60 Min Pre-Op    mineral oil-hydrophilic petrolatum   Topical BID    docusate sodium  100 mg Oral Daily    ampicillin-sulbactam  3 g Intravenous See Admin Instructions    mupirocin   Topical Daily    calcium-vitamin D  1 tablet Oral BID    vitamin D  5,000 Units Oral Daily    cycloSPORINE modified  100 mg Oral Nightly    allopurinol  300 mg Oral Daily    finasteride  5 mg Oral Daily    pravastatin  40 mg Oral Daily    sodium chloride flush  10 mL Intravenous 2 times per day       Objective:  Vitals:    07/05/18 0600   BP: (!) 80/54   Pulse: 74   Resp: 20   Temp: 97.1 °F (36.2 °C)   SpO2: 97%       Latest vitals obtained by myself  Pulse about 50  Respirations 10 and irregular  BP unable to obtain    Allergies: Latex; Codeine; Sulfa antibiotics; and Tape [adhesive tape]    General Appearance:Respirations irregular. Does open eyes to tactile stimuli and says one word. Pt looks to be actively dying. Skin: Warm. Seeping in places. Head: normocephalic and atraumatic  Pulmonary/Chest: Rales and rhonchi   Abdomen: soft, large, does not seem tender. Drain right upper abdomen. Dunn well positioned with clear yellow urine in the bag. Labs:     Recent Labs      07/04/18   0910   NA  135   K  5.4*   CL  103   CO2  14*   BUN  87*   CREATININE  4.7*   GLUCOSE  110*   CALCIUM  7.5*       Lab Results   Component Value Date    HGB 10.7 07/04/2018    HCT 34.9 07/04/2018         Assessment/Plan:  Pt with RCC with hx right nephrectomy and left RCC under surveillance.   He is currently being worked up for possible cholangiocarcinoma  He has had a change in his

## 2018-07-05 NOTE — PROGRESS NOTES
Dr. Montse Cristina contacted for a pain medication order requested by the patient. Morphine offered, patient refusing at this time.

## 2018-07-10 LAB — ANAEROBIC CULTURE: NORMAL

## (undated) DEVICE — GRADUATE TRIANG MEASURE 1000ML BLK PRNT

## (undated) DEVICE — ELECTRODE PT RET AD L9FT HI MOIST COND ADH HYDRGEL CORDED

## (undated) DEVICE — SPHINCTEROTOME: Brand: DREAMTOME™ RX 44

## (undated) DEVICE — BLOCK BITE 60FR RUBBER ADLT DENTAL